# Patient Record
Sex: FEMALE | Race: WHITE | Employment: FULL TIME | URBAN - METROPOLITAN AREA
[De-identification: names, ages, dates, MRNs, and addresses within clinical notes are randomized per-mention and may not be internally consistent; named-entity substitution may affect disease eponyms.]

---

## 2017-02-06 NOTE — PROGRESS NOTES
[address] IS A 32year old female HERE FOR Patient presents with:  Medication Request: Ritalin 99NN and Concerta 96FD       History of present illness:     Concentration ok. No personal classes she's taking. Long commute 80 minutes one way. Methylphenidate HCl (RITALIN) 20 MG Oral Tab Take 1 tablet (20 mg total) by mouth daily. At 2 PM daily Disp: 30 tablet Rfl: 0   [DISCONTINUED] Methylphenidate HCl ER (CONCERTA) 54 MG Oral Tab CR Take 1 tablet (54 mg total) by mouth daily.  Disp: 30 tablet Some Day Smoker     Types: Cigarettes    Smokeless tobacco: Never Used    Comment: x 1 a week or less    Alcohol Use: Yes  1.5 oz/week    3 Glasses of wine per week         Comment: 1 x week    Drug Use: No    Sexual Activity: Not on file   Not on file  Ot

## 2017-02-06 NOTE — PATIENT INSTRUCTIONS
Consider cutting back to 36 mg Concerta in a few months since your weight & heart rate have been affected by medication, will keep same for now. Recheck 3 months.

## 2017-04-24 NOTE — PATIENT INSTRUCTIONS
Same dose Concerta for now, but in July if you are willing to try a change to Concerta 36, or decrease the Ritalin, we can explore that as a way to decrease total medication. Recommend trying lower Concerta due to number of hours/day affected.

## 2017-04-24 NOTE — PROGRESS NOTES
[address] IS A 32year old female HERE FOR Patient presents with:  Medication Follow-Up: refill and med check       History of present illness:     Here for followup.  Still long drives to work, and plans to attend a week-long family therapy mg total) by mouth daily. Disp: 30 tablet Rfl: 0   Methylphenidate HCl ER (CONCERTA) 54 MG Oral Tab CR Take 1 tablet (54 mg total) by mouth daily.  Disp: 30 tablet Rfl: 0   [DISCONTINUED] Methylphenidate HCl ER (CONCERTA) 54 MG Oral Tab CR Take 1 tablet (54 palpitations, anorexia, insomnia, mood instability. She is now near her lowest weight. Stable since last visit which was in winter with heavier clothing, and she states her family is small and thin build.  Due to current stress with brother, end of year and

## 2017-05-25 NOTE — TELEPHONE ENCOUNTER
Patient called to see if we can fax to pharmacy - she's in school until 3:50. Explained that it's a controlled substance and we cannot fax. Told her someone will be here until 5pm today.

## 2017-07-18 NOTE — PATIENT INSTRUCTIONS
Decrease Concerta to 36 mg daily, 3 months rx  Ritalin 1/2 tablet twice daily later in day.     Continue same birth control pill    Recheck in 3 months and Pap at that time. (in October)

## 2017-07-18 NOTE — PROGRESS NOTES
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  -     Methylphenidate HCl (RITALIN) 20 MG Oral Tab; Take 0.5 tablets (10 mg total) by mouth 2 (two) times daily.  -     Methylphenidate HCl (RITALIN) 20 MG Oral Tab;  Take 0.5 t

## 2017-09-07 NOTE — PROGRESS NOTES
Darius Moore IS A 32year old female [address] [place] Patient presents with:  Pap: Room 4.  Pt is here for pap and meds       History of present illness:     Concerta during day at 36 mg was ok, but didn't have 20 mg Ritalin for awhile so when she was o Tab Take 1 tablet (20 mg total) by mouth daily. Disp: 30 tablet Rfl: 0       Allergy:      No Known Allergies    Family history:       History reviewed. No pertinent family history.     Social history:         Social History  Social History   Marital status Oral Tab; Take 0.5 tablets (10 mg total) by mouth 2 (two) times daily.  -     Methylphenidate HCl (RITALIN) 20 MG Oral Tab; Take 0.5 tablets (10 mg total) by mouth 2 (two) times daily.  -     Methylphenidate HCl (RITALIN) 20 MG Oral Tab;  Take 0.5 tablets (

## 2017-10-06 NOTE — PROGRESS NOTES
Feliciano Walters is a 32year old female here for Patient presents with:  Physical: Pap and routine meds      HPI:   Patient complains of here for refill and physical.     Some stuffiness of sinuses during year.     For ADD: Concerta 36 working well Glasses of wine per week         Comment: 1 x week    Drug use: No    Sexual activity: Not on file     Other Topics Concern    Caffeine Concern Yes    Comment: 1 cups daily x coffee    Stress Concern No    Weight Concern No    Special Diet No    Exercise Y no apparent distress  SKIN: no rashes,no suspicious lesions  EYES:PERRLA, EOMI, conjunctiva are clear  HEENT: atraumatic, normocephalic,external ears and TMs normal, nose patent with mild congestion, throat normal with mild erythema.   NECK: supple,no adeno Final   • LEUKOCYTES 12/03/2015 trace  Negative Final   • APPEARANCE 12/03/2015 sl cloudy  Clear Final   • URINE-COLOR 12/03/2015 pale yellow  Yellow Final   • Multistix Lot# 12/03/2015 [id number]  Numeric Final   • Multistix Expiration Date 12/03/2015 12/2016

## 2017-12-28 NOTE — PROGRESS NOTES
HPI:   Oneyda Hernandez is a 32year old female that presents for follow up ADHD. She is .   Currently on methylphenidate extended release 36 mg in the morning and then takes methylphenidate standard release 10 mg twice daily as ne oz   BMI 18.45 kg/m²  Estimated body mass index is 18.45 kg/m² as calculated from the following:    Height as of this encounter: 62.5\". Weight as of this encounter: 102 lb 8 oz. Vital signs reviewed. Appears stated age, well groomed.   Physical Exam:

## 2018-01-03 NOTE — TELEPHONE ENCOUNTER
Pt was given controlled Rx's at OV last week    Pt thought the dose was incorrect as it was 10mg, wanted new Rx for 20mg    After looking into it pt was given the same dose, just a different Sig    10mg BID as opposed to 20mg 1/2 BID

## 2018-01-04 NOTE — TELEPHONE ENCOUNTER
New Rx sent on 10/6/17 at [facility] for one year  DENIED AS DUPLICATE, INSTRUCTIONS TO PHARMACY TO CHECK FOR NEW/ REFILLS

## 2018-03-02 NOTE — TELEPHONE ENCOUNTER
Spoke with pt    Advised that she has been refilling too soon past several months in a row.     Pt states she has been self adjusting due to having her dose reduced \"2 appts ago\"    Dr Ana Stokes dropped Concerta from 54 mg to 36 mg in July 2017    Pt states

## 2018-03-02 NOTE — TELEPHONE ENCOUNTER
Got call from pharmacy informed that patient has been filling her Ritalin and Concerta   earlier each month. Now prescription says fill 3/11 patient wants it now. Advised pharmacy needs to fill when dr says on the prescription not early.  Patient has gettin

## 2018-03-02 NOTE — TELEPHONE ENCOUNTER
From: Darius Moore  Sent: 3/1/2018 8:40 PM CST  Subject: Medication Renewal Request    Darius Moore would like a refill of the following medications:     Methylphenidate HCl ER (CONCERTA) 36 MG Oral Tab CR [Joie Billings DO]   Pa

## 2018-03-02 NOTE — TELEPHONE ENCOUNTER
Pt called and stated:  \"Can we call the Pharmacy and authorize the Refill for now\".     Explained to pt (per TE - I read and summarized what the Triage note said to the pt) that it looks like, per the Pharmacy, she has been filling too soon and they conta

## 2018-03-02 NOTE — TELEPHONE ENCOUNTER
Verbal override to refill med now, need to see to discuss her need for additional medication. 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

## 2018-03-19 NOTE — PATIENT INSTRUCTIONS
Change dose to: Concerta 36 mg in morning (about 6 AM)  Ritalin 20 mg at lunch 12:20  Ritalin 10 mg at 4 or 5 PM before tutoring. Call if more fast heartrate, palpitations or lack of appetite for dinner. Recheck in 3 months.      Discussed coming in

## 2018-03-19 NOTE — PROGRESS NOTES
[address] IS A 29year old female HERE FOR Patient presents with:  Medication Follow-Up: Medication refill .  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  -     methylphenidate (RITALIN) 10 MG Oral Tab; 2 tablets (20 mg) at lunch, and 1 tablet at 4-5 PM.          Patient Instructions   Change dose to: Concerta 36 mg in morning (about 6 AM)  Rital

## 2018-05-21 NOTE — TELEPHONE ENCOUNTER
From: Vicki Apgar  Sent: 2018 9:12 AM CDT  Subject: Medication Renewal Request    Vicki Apgar would like a refill of the following medications:     Methylphenidate HCl ER (CONCERTA) 36 MG Oral Tab CR Bailey Galloway MD]

## 2018-05-22 NOTE — TELEPHONE ENCOUNTER
From: Suzie Donato  Sent: 5/21/2018 5:18 PM CDT  Subject: Medication Renewal Request    Suzie Donato would like a refill of the following medications:     Methylphenidate HCl ER (CONCERTA) 36 MG Oral Tab CR Aubree Gonzalez MD]

## 2018-06-18 NOTE — PROGRESS NOTES
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 Disp: 84 tablet Rfl: 3       Allergy:      No Known Allergies    Family history:       No family history on file.     Social history: Methylphenidate HCl ER (CONCERTA) 36 MG Oral Tab CR; Take 1 tablet (36 mg total) by mouth daily.  -     Methylphenidate HCl ER (CONCERTA) 36 MG Oral Tab CR; Take 1 tablet (36 mg total) by mouth daily.   -     methylphenidate (RITALIN) 10 MG Oral Tab; 2

## 2018-06-18 NOTE — PATIENT INSTRUCTIONS
Continue same combination of Concerta 36 mg in AM, ritalin 10 mg 2 tabs at lunch, and 1 tab of 10 mg at 4-5 PM.    Recheck in 3 months.

## 2018-09-11 NOTE — PATIENT INSTRUCTIONS
Physical in December at next refill visit for ADD. Refer DR. Roberta Ariza and associates for the acne and forehead lesion.

## 2018-09-11 NOTE — PROGRESS NOTES
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  Most days Oral Tab CR Take 1 tablet (36 mg total) by mouth daily.  Disp: 30 tablet Rfl: 0   methylphenidate (RITALIN) 10 MG Oral Tab 2 tablets (20 mg) at lunch, and 1 tablet at 4-5 PM. Disp: 90 tablet Rfl: 0   methylphenidate (RITALIN) 10 MG Oral Tab 2 tablets (20 mg file    Other Topics      Concerns:         Service: Not Asked        Blood Transfusions: Not Asked        Caffeine Concern: Yes          2 cups daily x coffee        Occupational Exposure: Not Asked        Hobby Hazards: Not Asked        Sleep Con are no Patient Instructions on file for this visit.

## 2018-11-27 NOTE — PROGRESS NOTES
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 Hungry today, appetite good, lost 3#. Not eating as much at school/work. No family history on file.     Social history:       Social History    Socioeconomic History      Marital status: Unknown      Spouse name: Not on file      Number of children: 0      Years of education: Not on file      Highest education level: Not o Heart regular rhythm, mildly fast heart rate. Affect appropriate. Test results:       Assessment & Plan:   Pauline Olvera was seen today for medication follow-up.     Diagnoses and all orders for this visit:    Attention deficit  -     Methylphenidate H

## 2018-12-03 NOTE — TELEPHONE ENCOUNTER
Zia at [facility] called. States patient is requesting Ritalin refill one week early because she is going out of town. They are asking if that is OK. Please advise.

## 2018-12-07 NOTE — TELEPHONE ENCOUNTER
Gynecology Medication Protocol [place]ki[place]94/7 3:41 AM   PASS-PENDING LAST PAP WNL--VIA MANUAL LOOKUP    Physical or Pelvic/Breast in past 12 or next 3 mos--VIA MANUAL LOOKUP     Written by Bruce Wright MD on 10/11/2017  2:21 PM   Pap is normal, HPV negat

## 2019-02-18 NOTE — PROGRESS NOTES
[address] IS A 29year old female HERE FOR Patient presents with:  Medication Request: Printed scripts        History of present illness:     Pt missed some work due to weather this year. Took 3 meds today but is out of all meds.  Methylp Socioeconomic History      Marital status: Unknown      Spouse name: Not on file      Number of children: 0      Years of education: Not on file      Highest education level: Not on file    Occupational History      Occupation: TEACHER        Employer: [employer] Bike Helmet: Not Asked        Seat Belt: Yes        Self-Exams: Not Asked    Social History Narrative      Long term relationship, no children. No pets (mom has one). Review of systems:     No chest discomfort, palpitations, or decreased appetite.

## 2019-02-18 NOTE — PATIENT INSTRUCTIONS
REcheck in 3 months.     COntinue usual regimen: Concerta generic 36 mg once a day, and 20 mg methylphenidate (Ritalin) at noon 1 tab 4-5 PM.

## 2019-03-04 NOTE — TELEPHONE ENCOUNTER
Name from pharmacy: [address] [facility] 1/20 TABLETS 28S         Will file in chart as: JUNEL FE 1/20 1-20 MG-MCG Oral Tab    Sig: TAKE ONE TABLET BY MOUTH DAILY.  NEEDS ANNUAL PHYSICAL FOR FURTHER REFILLS    Disp:  84 tablet    Refills:  0    Start: 3/4/2019    Beltran

## 2019-03-18 NOTE — TELEPHONE ENCOUNTER
Methylphenidate HCl ER (CONCERTA) 36 MG Oral Tab CR          Sig: Take 1 tablet (36 mg total) by mouth daily.     Disp:  30 tablet    Refills:  0    Start: 3/18/2019 - 4/17/2019    Earliest Fill Date: 3/18/2019    Class: Normal    Non-formulary    To pharma

## 2019-05-07 NOTE — PROGRESS NOTES
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     Social history:       Social History    Socioeconomic History      Marital status: Unknown      Spouse name: Not on file      Number of children: 0      Years of Weight Concern: No        Special Diet: No        Back Care: Not Asked        Exercise: Yes          Walking the dog 2x weekly        Bike Helmet: Not Asked        Seat Belt: Yes        Self-Exams: Not Asked    Social History Narrative      Long term r

## 2019-05-07 NOTE — TELEPHONE ENCOUNTER
From: Feliciano Walters  To: Jovanna Shelton MD  Sent: 5/7/2019 5:26 PM CDT  Subject: Visit Alkymos Button Dr. Valerio Leaks,     The pharmacy needs you to verify with my insurance before they can fill the new prescription.  Please call me as s

## 2019-05-07 NOTE — PATIENT INSTRUCTIONS
Change to Adderall XR 15 mg twice a day about 6 or 7 AM, and 6-8 hours later. Methylphenidate 10 mg only as needed for additional concentration aid at 5 PM or later.      Recheck 6 weeks with med check & physical, and call in between with any report of too

## 2019-05-08 NOTE — TELEPHONE ENCOUNTER
LOV 5/7/19    LAST LAB PAP 10/6/17    LAST RX 3/4/19    Next OV . No future appointments.       PROTOCOL    Gynecology Medication Protocol Passed5/8 2:47 PM   PASS-PENDING LAST PAP WNL--VIA MANUAL LOOKUP    Physical or Pelvic/Breast in past 12 or next 3 mos-

## 2019-05-08 NOTE — TELEPHONE ENCOUNTER
Patient called and stated she will be dropping it off at the [facility] in [place] [address].

## 2019-05-08 NOTE — TELEPHONE ENCOUNTER
Patient left voicemail requesting to change pharmacy to [facility] in [place], [address] [place] [address] Expressway

## 2019-06-06 NOTE — PROGRESS NOTES
Flavia Gallagher is a 34year old female here for Patient presents with:  Physical: No pap      HPI:   Patient complains of here for well exam.     Tolerating Adderall 15 mg AM 5:30 AM  and 2:30-3 PM, 10 mg methylphenidate 11 AM..  No psych side eff of children: 0      Years of education: Not on file      Highest education level: Not on file    Occupational History      Occupation: TEACHER        Employer: [employer] resource strain: Not on file      Food in History Narrative      Long term relationship, no children. No pets (mom has one). REVIEW OF SYSTEMS:     Negative for all systems listed except as documented below.   Constitutional:  Eye:  ENT:  Heme/lymph:  Cardiovascular:  GI:  :  OB/GYN:  Derm Endocervical or metaplastic cells present   Final   • General Categorization 10/06/2017 Negative for intraepithelial lesion or malignancy     Final   • Final Diagnosis Comment 10/06/2017    Final                    Value: This result contains rich text form [place]                                                                   First Screen:          Eli Boyle                                                               Rescreen:              Missy Huang

## 2019-06-06 NOTE — PATIENT INSTRUCTIONS
Health screening: Pap recommended beginning at age 24, then if normal every 3 years. HPV testing over age 27, HPV vaccine recommended if under 26. STD screening: if sexually active, chlamydia and gonorrhea testing recommended if single and/or under age 22. ages 25 to 44. Talk with your healthcare provider to make sure you’re up-to-date on what you need.   Screening Who needs it How often   Alcohol misuse All women in this age group At routine exams   Blood pressure All women in this age group Yearly checkup i risk for infection should talk with their healthcare provider Ask your healthcare provider   Vision All women in this age group At least 1 complete exam in your 25s, and 2 in your 35s   Vaccine2 Who needs it How often   Chickenpox (varicella) All women in a Td booster after age 25, then Td every 10 years   Counseling Who needs it How often   BRCA gene mutation testing for breast and ovarian cancer susceptibility Women with increased risk for having gene mutation When your risk is known   Breast cancer and c

## 2019-06-06 NOTE — PROGRESS NOTES
Amrit Montero is a 34year old female who presents today for one step TST. Questionnaire completed, no contraindications to placement. Pt advised she will need to returnin 48-72 hours for interpretation.   Unfortunately, given upcoming weekend,

## 2019-06-09 NOTE — TELEPHONE ENCOUNTER
From: Feliciano Walters  To: Jovanna Shelton MD  Sent: 6/8/2019 8:40 PM CDT  Subject: Prescription Question    Good evening! I just saw that my refill for the Remonia Dubin is being sent to the [facility].  Can I please change the

## 2019-07-30 NOTE — PROGRESS NOTES
Vicki Apgar IS A 34year old female [address] 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      Just returned from [place] with partner's family by mouth daily. Disp: 30 tablet Rfl: 0   Amphetamine-Dextroamphet ER (ADDERALL XR) 15 MG Oral Capsule SR 24 Hr Take 1 capsule (15 mg total) by mouth 2 (two) times daily.  Disp: 60 capsule Rfl: 0   methylphenidate (RITALIN) 10 MG Oral Tab Take 1 tablet (10 m violence:        Fear of current or ex partner: Not on file        Emotionally abused: Not on file        Physically abused: Not on file        Forced sexual activity: Not on file    Other Topics      Concerns:         Service: Not Asked        Blo (RITALIN) 10 MG Oral Tab; Take 1 tablet (10 mg total) by mouth daily. In afternoon          Patient Instructions   Continue current regimen, Adderall twice a day and methylphenidate later in afternoon. Recheck 3 months.

## 2019-07-30 NOTE — PATIENT INSTRUCTIONS
Continue current regimen, Adderall twice a day and methylphenidate later in afternoon. Recheck 3 months.

## 2019-09-06 NOTE — TELEPHONE ENCOUNTER
RX FROM July WAS FOR BID BUT AUGUST AND SEPT WERE NOT  Medication Detail     Medication Quantity Refills Start End   Amphetamine-Dextroamphet ER (ADDERALL XR) 15 MG Oral Capsule SR 24 Hr () 60 capsule 0 2019   Sig:   Take 1 capsule (1

## 2019-09-06 NOTE — TELEPHONE ENCOUNTER
Patient says her Adderall RX was written wrong. She takes it BID written for  QD. Given #60 but pharmacy would only give #30. She needs 2 new Rx.

## 2019-09-10 NOTE — TELEPHONE ENCOUNTER
ADD RX for 2  Tabs will not go through needs a Prior auth. Per pharamcy. Unable to do through BC/BS on phone.      Prior auth done wait for approval or denial.

## 2019-09-17 NOTE — TELEPHONE ENCOUNTER
From: Denzel Bermudez  To: Aby Young MD  Sent: 9/17/2019 4:49 PM CDT  Subject: Prescription Question    Hi Dr. Siomara Sauer,  My last adderall ER 15 milligram tablets were adjusted since the directions didn't say to take it twice daily.  They we

## 2019-09-18 NOTE — TELEPHONE ENCOUNTER
Prior auth denied. Doing an appeal. Faxed dr notes for appeal. Starting with   [employer]   [phone]. Starting notes from 2014 and any pertinent office notes.

## 2019-09-18 NOTE — TELEPHONE ENCOUNTER
Called insurance for PA pending. Rx is still in review but will be upgraded to urgent for approval. Will be approved or denied in 72 hours. Left detailed message for patient on phone regarding PA.  Patient can also  pay out of pocket and get reimbursed

## 2019-09-24 NOTE — TELEPHONE ENCOUNTER
Pt called with multiple prescription questions. She was transferred to Triage  to leave a detailed message.

## 2019-09-25 NOTE — TELEPHONE ENCOUNTER
Kitty Fernandezigned  Yesterday                Pt called with multiple prescription questions. She was transferred to Triage  to leave a detailed message.

## 2019-09-26 NOTE — TELEPHONE ENCOUNTER
Patient called about her Rx  for Adderall thinks it was done wrong. But it is correct it is in appeals. Did leave message that patient can pay for RX before until approved. She did pay for it.       Dispensed Written Strength Quantity Refills Days Supply Pro

## 2019-09-30 NOTE — TELEPHONE ENCOUNTER
Spoke with [employer] again today to get update on Prior Auth. The issues had become complicated because pt's insurance changed to a PPO effective 9/1/19.     They received the appeal on 9/18/19 but it was not marked as urgent, they have until 10/3/19 to resp

## 2019-10-01 NOTE — PROGRESS NOTES
[address] IS A 34year old female HERE FOR Patient presents with:  ADHD: medication check up  Imm/Inj: flu shot       History of present illness:     Was given Adderall tablets last time, (15 mg bid XR), they didn't last long.  Feels Adderall tablet Rfl: 0   [START ON 12/12/2019] methylphenidate (RITALIN) 10 MG Oral Tab Take 1 tablet (10 mg total) by mouth daily.  Disp: 30 tablet Rfl: 0   Amphetamine-Dextroamphet ER (ADDERALL XR) 15 MG Oral Capsule SR 24 Hr Take 1 capsule (15 mg total) by mouth activity:        Days per week: Not on file        Minutes per session: Not on file      Stress: Not on file    Relationships      Social connections:        Talks on phone: Not on file        Gets together: Not on file        Attends [employer] service: No 10 MG Oral Tab; Take 1 tablet (10 mg total) by mouth daily for 15 days. In afternoon  -     Amphetamine-Dextroamphet ER (ADDERALL XR) 15 MG Oral Capsule SR 24 Hr;  Take 1 capsule (15 mg total) by mouth 2 (two) times daily.  -     Amphetamine-Dextroamphet ER

## 2019-10-02 NOTE — TELEPHONE ENCOUNTER
Appeal came back as coverage only on ER tablets, not capsules    Left message for pt with this info, royal need to resend Rx if pt is agreeable

## 2019-10-03 NOTE — TELEPHONE ENCOUNTER
Dr Cristo Romano, pt willing to try tablets of the same strength    Will need to send new Rx's as tablets

## 2019-10-04 NOTE — TELEPHONE ENCOUNTER
Dr Aylin Srinivasan, we appear to have exhausted all appeals  Please advise?     Pt's insurance changed 9-1-19 and the new plan's response to our recent appeal was they will not approve the ER capsules  But will approve the XR tablets (which they imply is still consi

## 2019-10-22 NOTE — TELEPHONE ENCOUNTER
Patient comment was from last month. I thought I gave her enough for next 2 months. Please check on when she is filling the Ritalin. If she wants 30 tablets to get her caught up until 11/9, I can send one rx for 30 and then 2 rx's for 60 each.

## 2019-10-28 NOTE — TELEPHONE ENCOUNTER
Patient called back saying she is out of her medication the one is denied by her insurance and the other she could only get 15 pills it is unclear witch one is witch  she has asked for  Call back to figure this out

## 2019-10-29 NOTE — TELEPHONE ENCOUNTER
LOV 10/19      Please advise on refill. Thank You    30 tablets to get her caught up until 11/9, I can send one rx for 30 and then 2 rx's for 60 each.

## 2019-12-23 NOTE — TELEPHONE ENCOUNTER
daniel calling wanting to let  know patient is filling a medication sooner then normal , sent to triage voicemail .

## 2019-12-23 NOTE — PROGRESS NOTES
[id number] IS A 34year old female HERE FOR Patient presents with:  Medication Request       History of present illness:     Doing well at her new school, closer to her and has more time for after-school activities. No problems with med. file      Number of children: 0      Years of education: Not on file      Highest education level: Not on file    Occupational History      Occupation: TEACHER        Employer: [employer]    Social Needs      Financial resource strain: Not on Self-Exams: No    Social History Narrative      Long term relationship, no children. No pets (mom has one). Review of systems:     No other new symptoms or problems reported.      Exam:     BP 98/60   Pulse 79   Temp 99.2 °F (37.3 °C) (Oral)   Resp 16

## 2020-03-10 NOTE — PROGRESS NOTES
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     Current meds really aren't he Methylphenidate HCl ER (CONCERTA) 27 MG Oral Tab CR Take 1 tablet (27 mg total) by mouth 2 (two) times daily. 60 tablet 0   • methylphenidate (RITALIN) 10 MG Oral Tab Take 1 tablet (10 mg total) by mouth daily.  30 tablet 0   • Norethin Ace-Eth Estrad-JESS (J violence:        Fear of current or ex partner: Not on file        Emotionally abused: Not on file        Physically abused: Not on file        Forced sexual activity: Not on file    Other Topics      Concerns:         Service: Not Asked        Blo Methylphenidate HCl (RITALIN) 20 MG Oral Tab; Take 1 tablet (20 mg total) by mouth Noon. Second of 3  -     Methylphenidate HCl (RITALIN) 20 MG Oral Tab; Take 1 tablet (20 mg total) by mouth Noon. Third of 3.           Patient Instructions   New rxs written

## 2020-03-10 NOTE — PROGRESS NOTES
No prolonged tobacco discussion held. Pt has rare social cigarette with going out for drinks which doesn't happen often. NOt interested in stopping but I told her it's best to give up entirely.

## 2020-05-14 NOTE — TELEPHONE ENCOUNTER
LOV 3/10/2020    LAST LAB 10/06/2017    LAST RX   Norethin Ace-Eth Estrad-FE (JUNEL FE 1/20) 1-20 MG-MCG Oral Tab 84 tablet 3 8/6/2019    Sig:   Take 1 tablet by mouth once daily. Next OV No future appointments.     PROTOCOL   Gynecology Medicatio

## 2020-06-01 NOTE — PATIENT INSTRUCTIONS
Keep appointment in July for well visit (Pap & physical). Continue same doses of Concerta 27 mg twice daily and ritalin 20 mg in early evening. Take care and stay well and prudent with socialization over the summer!

## 2020-06-01 NOTE — PROGRESS NOTES
[address] IS A 27year old female evaluated via telehealth visit FOR Patient presents with:  Medication Follow-Up: ADD  meds   due to current national emergency with SARS-CoV-2 pandemic.      History of present illness:   2-way communication tablet 0   • Methylphenidate HCl ER (CONCERTA) 27 MG Oral Tab CR Take 1 tablet (27 mg total) by mouth 2 (two) times daily.  5:30 AM & 3:30 PM 60 tablet 0   • Methylphenidate HCl ER (CONCERTA) 27 MG Oral Tab CR Take 1 tablet (27 mg total) by mouth 2 (two) ti Active member of club or organization: Not on file        Attends meetings of clubs or organizations: Not on file        Relationship status: Not on file      Intimate partner violence:        Fear of current or ex partner: Not on file        Emotionally a

## 2020-07-30 NOTE — TELEPHONE ENCOUNTER
Future Appointments   Date Time Provider Andres Kasie   7/30/2020  2:20 PM Zoila Bojorquez MD [facility]

## 2020-07-30 NOTE — PATIENT INSTRUCTIONS
Don't cut concerta since it is an extended release med. Extra ritalin would be preferable.     Suggest the Concerta 27 mg twice a day as you currently take it, ritalin 20 mg at 2 PM and an extra 10 mg in evening if you need to complete work in evening 6-10

## 2020-07-30 NOTE — TELEPHONE ENCOUNTER
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

## 2020-07-30 NOTE — TELEPHONE ENCOUNTER
Patient just got off the phone with dr Kanika Calvillo and patient said she is missing the medication for 09/01/2020 for medication Methylphenidate HCl (RITALIN) 20 MG Oral Tab

## 2020-07-30 NOTE — PROGRESS NOTES
[address] IS A 27year old female HERE FOR No chief complaint on file. History of present illness:     Virtual visit with both audio and visual 2-way components, conducted with Ector in [facility], due to patient exposure to coronavirus.  Felipe Vance in afternoon or early evening 45 tablet 0   • Methylphenidate HCl ER (CONCERTA) 27 MG Oral Tab CR Take 1 tablet (27 mg total) by mouth 2 (two) times a day.  Early am and lunch 60 tablet 0   • Methylphenidate HCl ER (CONCERTA) 27 MG Oral Tab CR Take 1 tablet Food insecurity:        Worry: Not on file        Inability: Not on file      Transportation needs:        Medical: Not on file        Non-medical: Not on file    Tobacco Use      Smoking status: Current Some Day Smoker        Types: Cigarettes      Smoke Affect normal, appropriate. Test results:       Assessment & Plan:   Diagnoses and all orders for this visit:    Attention deficit    Other orders  -     Methylphenidate HCl ER (CONCERTA) 27 MG Oral Tab CR;  Take 1 tablet (27 mg total) by mouth 2 (t

## 2020-07-31 NOTE — TELEPHONE ENCOUNTER
Called pt stating had received a call back from PCP and cleared up rx issues. Nothing further needed at this time. No further questions or concerns. Pt verbalized understanding and agreed with POC.

## 2020-08-07 NOTE — TELEPHONE ENCOUNTER
LOV 10/6/2017    LAST LAB 10/16/2017    LAST RX 5/14/2020    Next OV No future appointments.       PROTOCOL  Gynecology Medication Protocol Passed8/7 1:22 PM   PASS-PENDING LAST PAP WNL--VIA MANUAL LOOKUP    Physical or Pelvic/Breast in past 12 or next 3 mo

## 2020-10-15 NOTE — TELEPHONE ENCOUNTER
Methylphenidate HCl ER (CONCERTA) 27 MG Oral Tab CR    Patient was scheduled for Saturday 10/17/20 however since the doctor is not working that Saturday the patient was moved to 11/7/2020.  Patient is going to run out of the above medication and requesting

## 2020-10-15 NOTE — TELEPHONE ENCOUNTER
LOV 7/30/2020    LAST LAB n/a    LAST RX   Methylphenidate HCl ER (CONCERTA) 27 MG Oral Tab CR 60 tablet 0 9/30/2020 10/30/2020         Next OV   Future Appointments   Date Time Provider Andres Ferro   11/7/2020  9:00 AM Buddy Velásquez MD [facility]

## 2020-10-19 NOTE — TELEPHONE ENCOUNTER
Pt called to ck on status of Refill said pharmacy only received the 27mg still waiting for the 20mg Rx

## 2020-10-20 RX ORDER — METHYLPHENIDATE HYDROCHLORIDE 20 MG/1
TABLET ORAL
Qty: 45 TABLET | Refills: 0 | Status: CANCELLED | OUTPATIENT
Start: 2020-10-20

## 2020-10-27 NOTE — TELEPHONE ENCOUNTER
Called Pharmacy to ask what the issue with insurance was. Advised Dr. Lesia Nichols is licensed to prescribe these meds. They checked and said the patient was trying to pick them up too soon.   She picked up one today and she will be able to  the other to

## 2020-10-27 NOTE — TELEPHONE ENCOUNTER
Patient stated she is trying to fill her medications:   Methylphenidate HCl (RITALIN) 20 MG Oral Tab  And   Methylphenidate HCl ER (CONCERTA) 27 MG Oral Tab CR    She stated the pharmacy told her that the  Is not licensed with insurance any more.     Pt

## 2020-11-09 NOTE — TELEPHONE ENCOUNTER
LOV 7/30/2020    LAST LAB 10/06/2017    LAST RX   AUROVELA FE 1/20 1-20 MG-MCG Oral Tab 84 tablet 0 8/7/2020    Sig:   TAKE ONE TABLET BY MOUTH ONCE DAILY           Next OV   Future Appointments   Date Time Provider Andres Ferro   11/23/2020  1:00 PM

## 2020-11-23 NOTE — PROGRESS NOTES
[address] IS A 27year old female [facility] Patient presents with:  Medication Follow-Up: insurance change in 1/21 . (Concerta ) needs alternative . 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  45 tablet 0   • Methylphenidate use: Yes        Alcohol/week: 2.5 standard drinks        Types: 3 Glasses of wine per week        Comment: Socially       Drug use: No      Sexual activity: Not on file    Lifestyle      Physical activity        Days per week: Not on file        Minutes pe patient needs and cannot  tomorrow. -     Methylphenidate HCl ER (CONCERTA) 27 MG Oral Tab CR; Take 1 tablet (27 mg total) by mouth 2 (two) times a day. generic  -     Methylphenidate HCl ER (CONCERTA) 27 MG Oral Tab CR;  Take 1 tablet (27 mg total)

## 2020-11-24 NOTE — TELEPHONE ENCOUNTER
Pt called stating she was informed by the pharmacy the insurance co will not cover the     \"Methylphenidate HCl ER (CONCERTA)\"    She said they faxed something over in regard to what they will cover.

## 2020-11-25 NOTE — TELEPHONE ENCOUNTER
Contacted [employer] indicating per pt's insurance will not cover BID, only once a day. Awaiting fax to fill out PA form for BID. Will need to privide clincial notes to support medical necessity to be taking BID.  FYI. Hold for fax.

## 2020-11-27 NOTE — TELEPHONE ENCOUNTER
Patient stated she is waiting to hear about a medication that will be covered by her insurance. She stated she has been waiting for 5 days now.

## 2020-11-30 NOTE — TELEPHONE ENCOUNTER
Called pt to inform update that we attempted PA, however, per pt's insurance will not cover twice a day, only once a day.  Received form for step therapy request. Informed PCP will RTC tomorrow (12/1) to address and will fax back to [employer] @[phone]

## 2020-11-30 NOTE — TELEPHONE ENCOUNTER
Patient stated that an authorization form was sent to Dr. Aylin Srinivasan for the following medication.       Methylphenidate HCl ER (CONCERTA) 27 MG Oral Tab CR 60      Patient stated that she has been out of the medication and has been taking extra of the 20 mg pi

## 2020-12-01 NOTE — TELEPHONE ENCOUNTER
Triage call transferred. Spoke with pt to inform Dr. Jose Ahn completed step therapy form and we sent with all clinical notes from within the last year faxed to [employer] @[phone]. Received fax confirmation. No further questions or concerns.  P

## 2020-12-01 NOTE — TELEPHONE ENCOUNTER
Completed step therapy form along with all clinical notes from within the last year faxed to "[employer] @[phone]. Received fax confirmation. Hold for response.

## 2020-12-02 NOTE — TELEPHONE ENCOUNTER
Kallie Neil from [facility] stated that the medication Concera was approved for 60 pills per 30 days.     Kallie Neil, [address] [facility]

## 2020-12-02 NOTE — TELEPHONE ENCOUNTER
Lm for pt ([id number] per HIPAA) to inform received response from [facility] indicating APPROVED Concerta 46SO BID 62/92/80- 11/27/2021. To call back at the office if any further questions.

## 2020-12-15 NOTE — TELEPHONE ENCOUNTER
LOV 3/10/2020    LAST LAB    LAST RX concerta     Next OV No future appointments. PROTOCOL  Methylphenidate HCl ER (CONCERTA) 27 MG Oral Tab CR          Sig: Take 1 tablet (27 mg total) by mouth 2 (two) times a day.  Generic please, fill today, patient n

## 2021-01-15 NOTE — TELEPHONE ENCOUNTER
Pt is going out of town and wants to know if she can get early refills on   Methylphenidate HCl ER (CONCERTA) 27 MG Oral Tab CR and Methylphenidate HCl (RITALIN) 20 MG Oral Tab said pharmacy told her she needs to contact 's office

## 2021-01-15 NOTE — TELEPHONE ENCOUNTER
rx done as requested, note sent to pt that she should be able to stay on a monthly regimen without early fills now since the Concerta was approved.

## 2021-02-02 NOTE — TELEPHONE ENCOUNTER
LOV 11/23/2020    LAST LAB 10/6/2017     LAST RX   AUROVELA FE 1/20 1-20 MG-MCG Oral Tab 84 tablet 0 11/9/2020    Sig:   TAKE 1 TABLET BY MOUTH ONCE DAILY           Next OV   Future Appointments   Date Time Provider Andres Ferro   2/24/2021  4:20 PM P

## 2021-02-26 NOTE — PATIENT INSTRUCTIONS
Health screening: Pap recommended if normal every 3 years. Will notify if suggestion of infection on pap comments. You may have yeast & bacterial vaginitis. HPV testing with pap over age 27,  Tdap or Td recommended if tetanus not received for 10 years.  Co women in this age group Yearly checkup if your blood pressure is normal  Normal blood pressure is less than 120/80 mm Hg  If your blood pressure reading is higher than normal, follow the advice of your healthcare provider   Breast cancer All women in this often   Chickenpox (varicella) All women in this age group who have no record of this infection or vaccine 2 doses; the second dose should be given 4 to 8 weeks after the first dose   Hepatitis A Women at increased risk for infection should talk with their When your risk is known   Breast cancer and chemoprevention Women at high risk for breast cancer When your risk is known   Diet and exercise Women who are overweight or obese When diagnosed, and then at routine exams   Domestic violence Women at the age in

## 2021-02-26 NOTE — PROGRESS NOTES
Inna Wade is a 27year old female here for Patient presents with:  Physical: with pap      HPI:   Patient complains of here for well exam.    Current meds working out ok 27 mg bid with Concerta and 1.5 tabs of 20 mg ritalin later in day. tomorrow. 60 tablet 0   • Methylphenidate HCl ER (CONCERTA) 27 MG Oral Tab CR Take 1 tablet (27 mg total) by mouth 2 (two) times a day.  60 tablet 0   • Methylphenidate HCl ER (CONCERTA) 27 MG Oral Tab CR Take 1 tablet (27 mg total) by mouth 2 (two) times a Employer: [employer]    Social Needs      Financial resource strain: Not on file      Food insecurity        Worry: Not on file        Inability: Not on file      Transportation needs        Medical: Not on file        Non-medical: Not on except as documented below. Constitutional: no appetite change. Eye:  ENT:  Heme/lymph:  Cardiovascular:no palpitations or chest pain  GI:  :  OB/GYN: plans for kids in 2 years or so. Asked if she needs to go off the pill now.    Derm:  Neuro: no insom 0.0mm  0.0 - 11 mm Final       Going to [place] to see mother on 3/13 needs rx by 3/11.      ASSESSMENT AND PLAN:   Bonilla Gaspar was seen today for physical.    Diagnoses and all orders for this visit:    Encounter for well woman exam with routine gynecological ex advance including psych consult re: any acceptable meds during pregnancy for ADD symptoms. BMI is Body mass index is 19.57 kg/m²., Habits:   To avoid harmful effects on health, recommend not to take more than 1 alcoholic drink (12 oz beer, 6 oz wine or 1.5 ages 24 and older Women between ages 24 and 34 should have a Pap test every 3 years; women between ages 27 and 72 are advised to have a Pap test plus an HPV test every 5 years   Chlamydia Sexually active women ages 22 and younger, and women at increased ri over 6 months; second dose should be given 1 month after the first dose; the third dose should be given at least 2 months after the second dose and at least 4 months after the first dose   Haemophilus influenzae Type B (HIB) Women at increased risk for inf Prevention of skin cancer in fair-skinned adults At routine exams   Use of tobacco and the health effects it can cause All women in this age group Every visit   1 According to the ACS, women ages 21 to 44 years should have a clinical breast exam (CBE) as p

## 2021-03-12 NOTE — TELEPHONE ENCOUNTER
Pharmacy calling stating that pt is requesting refill on medication. She is going out of town tomorrow and needs it refilled. Pharmacy also said that pt takes 1 & 1/2 pills a day, but script is written for 30 pills which would be for 20 days.  Asking if scr

## 2021-03-12 NOTE — TELEPHONE ENCOUNTER
Last refill  Methylphenidate HCl (RITALIN) 20 MG Oral Tab 30 tablet 0 3/11/2021 4/10/2021   Sig:   Take 1.5 tablets (30 mg total) by mouth daily. Early evening       rx pended for your approval if ok. Please review and advise. Thank you.

## 2021-05-06 NOTE — PROGRESS NOTES
Video visit  Please note that the following visit was completed using two-way, real-time interactive audio and video communication.   This has been done in good luis to provide continuity of care in the best interest of the provider-patient relationship, d HCl 20 MG Oral Tab Take 1 to 1.5 tablets in the afternoon or evening 45 tablet 0   • Norethin Ace-Eth Estrad-FE (AUROVELA FE 1/20) 1-20 MG-MCG Oral Tab Take 1 tablet by mouth daily.  84 tablet 3   • Methylphenidate HCl ER (CONCERTA) 27 MG Oral Tab CR Take 1 MG Oral Tab CR Take 1 tablet (27 mg total) by mouth 2 (two) times a day. Generic 60 tablet 0   • Methylphenidate HCl (RITALIN) 20 MG Oral Tab Take 1.5 tablets (30 mg total) by mouth daily.  45 tablet 0     Allergies:No Known Allergies    Objective:   Physic

## 2021-06-02 NOTE — TELEPHONE ENCOUNTER
Pt calling stating she got her last refill on 5/7/21, and asking if she can get her prescriptions refilled sooner. She is going out of town on a conference June 7th and asking if doctor will send refill to pharmacy to be picked up this weekend.  Please yobani

## 2021-06-25 NOTE — TELEPHONE ENCOUNTER
Pt is going out of town on 6/30/21 wants to know if we can contact pharmacy and inform them ok for her to  her Rx's below early,  already sent Rx's to pharmacy but they are dated to be filled on 7/10/21    Methylphenidate HCl 20 MG Oral Tab  Methy

## 2021-06-28 NOTE — TELEPHONE ENCOUNTER
Called Pharmacy who states the patient needs to check with her insurance first to see if they are willing to cover and early rx. Then both the ordering physician and the Pharmacist have to agree to fill early. Called patient and notified of above.   She

## 2021-06-28 NOTE — TELEPHONE ENCOUNTER
Dr. Shauna Menjivar,  Please advise if early refill is OK with you. Methylphenidate HCl ER (CONCERTA) 27 MG Oral Tab CR 60 tablet 0 7/10/2021 8/9/2021   Sig:   Take 1 tablet (27 mg total) by mouth 2 (two) times a day.  Early AM & noon       Methylphenidate HCl 20

## 2021-06-28 NOTE — TELEPHONE ENCOUNTER
Patient called insurance and they agreed to authorize early fill on  6/30/2021. She is leaving on  7/01/2021 early morning for trip.

## 2021-06-28 NOTE — TELEPHONE ENCOUNTER
Called pt to inform called [phone] listed and spoke with pharmacist to inform per PCP ok for early refill of Methylphenidate HCl 20 MG and Methylphenidate HCl ER 27 MG.  Informed pt contacted insurance and received authorization for early refill

## 2021-07-20 NOTE — TELEPHONE ENCOUNTER
Last tele visit with Dr. Mulu Ferguson 5/6/21  Last refill: 7/10/21    Future Appointments   Date Time Provider Andres Kasie   8/30/2021  3:20 PM Víctor Sim MD [facility]     Rx's pended for your approval if ok.   To on call- Dr. Mulu Ferguson  (changed start montez

## 2021-07-20 NOTE — TELEPHONE ENCOUNTER
We had to rsch pt's appt to 8/30/21 for , pt only wants to see  needs refill for 30 day supply on concerta and methylphenidate

## 2021-07-27 NOTE — TELEPHONE ENCOUNTER
Pt calling back and said that she picked up her last refill on the 27th. So she is out and needs her refill for today. Needs the refill changed from 8/10/21 to today 7/27/21.  Please advise

## 2021-07-27 NOTE — TELEPHONE ENCOUNTER
[facility] and spoke with pharmacist to clarify prescription dates. Records indicate last received 3 month supply on 5/6/21- pt picked up on 5/7/21, 6/7/21, and 6/28/21. Other scripts on file shows \"closed\". Pt due for new script.  Recent s

## 2021-07-28 NOTE — TELEPHONE ENCOUNTER
Also see MyChart message from 07-22-21 and Refill message from 07-20-21. Patient received 30 day supplies of medications on 06-07-21 and 06-28-21 (picked up early d/t work conference). Should have enough pills to last until August 6th.  States she doesn'

## 2021-07-28 NOTE — TELEPHONE ENCOUNTER
From: Tacos Pichardo  To: Yeimy Moreau DO  Sent: 7/28/2021 2:46 PM CDT  Subject: Prescription Question    Good afternoon,     I have searched my house inside and out and can’t seem to find the last 27mg bottle that was filled for vacation anywh

## 2021-07-28 NOTE — TELEPHONE ENCOUNTER
Patient stated her script for Methylphenidate HCI 20 mg and Methylphenidate HCI ER 27 MG was filled on 6/27/21. She said she is out of medication and pharmacy will need a new script for today.     Judy [address], [address]

## 2021-07-28 NOTE — TELEPHONE ENCOUNTER
Re-pended Rx's with start date of today (7/28/21). RNs unable to sign prescriptions due to security. To on call- Dr. Nallely Hunter  Please review and sign. Thank you.

## 2021-09-09 NOTE — PROGRESS NOTES
Video visit  Please note that the following visit was completed using two-way, real-time interactive audio and video communication.   This has been done in good luis to provide continuity of care in the best interest of the provider-patient relationship, d Tab CR Take 1 tablet (27 mg total) by mouth 2 (two) times a day. Early AM and noon 60 tablet 0   • methylphenidate (RITALIN) 20 MG Oral Tab Take 1.5 tablets (30 mg total) by mouth daily.  Early evening 45 tablet 0   • [START ON 10/10/2021] methylphenidate ( 1 tablet (27 mg total) by mouth 2 (two) times a day. Early AM and noon   • Methylphenidate HCl ER (CONCERTA) 27 MG Oral Tab CR 60 tablet 0     Sig: Take 1 tablet (27 mg total) by mouth 2 (two) times a day.  Early AM and noon   • methylphenidate (RITALIN) 20

## 2021-11-04 NOTE — TELEPHONE ENCOUNTER
Methylphenidate and Concerta   Asking for fill date to be changed to  tomorow 11/05/21  so Prior Auth may be obtained before she runs out of medication on Sunday.     Please advise

## 2021-11-06 NOTE — TELEPHONE ENCOUNTER
Pharmacy called questioning patient picking up Ritalin and Concerta early. I reviewed TE and patient message from 11/4 with pharmacist stating ok to fill early.  Pharmacist is going to fill Rxs now but states that he does this for patient (early fills) ever

## 2021-11-22 NOTE — TELEPHONE ENCOUNTER
D/w pt at ov and reiterated that meds need to be filled at the time they are due and cannot be earlier.  She acknowledged understanding

## 2021-11-23 NOTE — PROGRESS NOTES
Subjective:   Patient ID: Suzie Donato is a 32year old female. HPI   31yr old female presents for f/u on ADD meds. ADD, stable. States she feels like over the past year she has been on a good regimen with PCP.  Taking Concerta 33EV po bid Cream      • Methylphenidate HCl ER (CONCERTA) 27 MG Oral Tab CR Take 1 tablet (27 mg total) by mouth 2 (two) times a day. Early AM and noon 60 tablet 0   • methylphenidate (RITALIN) 20 MG Oral Tab Take 1.5 tablets (30 mg total) by mouth daily.  Early eveni before they are due, informed pt that these are controlled substances and we will not approve early refills, I along with the pharmacist do not feel comfortable with this  - she will likely need to see psychiatrist if she will continues to require current

## 2021-12-08 NOTE — TELEPHONE ENCOUNTER
Triage call transferred. Spoke with pt indicating had picked up last script on 11/4/21 and is now out. Informed per conversation with on call- AMS and rx dispense hx, pt picked up rx on 11/6/21.   Pt indicated per [employer], with another RN, Holger Matamoros

## 2021-12-08 NOTE — TELEPHONE ENCOUNTER
Pharmacy calling stating that she needs to speak with a nurse. Pt states she can  prescription sooner but they need the OK to do that. Please call pharmacy back if they can refill today or if pt has to wait until the 10th.

## 2021-12-08 NOTE — TELEPHONE ENCOUNTER
See TE 11/6/21 and OV 11/22/21: Attention deficit disorder (ADD) without hyperactivity  (primary encounter diagnosis)  Tachycardia  - denies any SEs from medication  - tachycardia noted on exam today and previous ov, discussed with pt that it is a SE of me

## 2021-12-10 NOTE — TELEPHONE ENCOUNTER
Spoke to pharmacist. Stated that insurance will only cover 30 tablets in a 30 day period, and that another PA might be needed. Spoke to Shamar Lee at [phone].  Stated that she could not tell if another PA was needed but that she would fax m

## 2022-03-20 PROBLEM — F98.8 ATTENTION DEFICIT DISORDER (ADD) WITHOUT HYPERACTIVITY: Status: ACTIVE | Noted: 2022-03-20

## 2022-03-23 NOTE — TELEPHONE ENCOUNTER
Pt calling back-- had scheduled an appt for annual pe but appt was not long enough. Lives farther away and still in school. Scheduled pt for annual pe in June. Pt is asking for refill in May to get her through until appt. Rehana Tejada she has one to get picked up for April, but would need one for May.  Please advise    Future Appointments   Date Time Provider Andres Ferro   6/6/2022  4:00 PM Jesús Schwartz,  [facility]     methylphenidate (RITALIN) 20 MG Oral Tab  Methylphenidate HCl ER (CONCERTA) 27 MG Oral Tab CR

## 2022-04-11 RX ORDER — METHYLPHENIDATE HYDROCHLORIDE 20 MG/1
30 TABLET ORAL DAILY
Qty: 45 TABLET | Refills: 0 | OUTPATIENT
Start: 2022-04-11 | End: 2022-05-11

## 2022-04-11 RX ORDER — METHYLPHENIDATE HYDROCHLORIDE 27 MG/1
27 TABLET ORAL 2 TIMES DAILY
Qty: 60 TABLET | Refills: 0 | OUTPATIENT
Start: 2022-04-11 | End: 2022-05-11

## 2022-04-11 NOTE — TELEPHONE ENCOUNTER
[facility]  To: Heather Colon DO  Sent: 4/10/2022 7:54 PM CDT  Subject: Refill Until Next Appointment    Good Evening,  I just put in a request to refill my medications. Last visit was not with Dr. Jerry French because she was out and the doctor only sent 2 months worth. My next appointment isn't until June and I will need a refill for both medications to get me through May until my appointment. Thank you!

## 2022-04-11 NOTE — TELEPHONE ENCOUNTER
LOV 02-21-22. Had follow up on 04-09-22 but cancelled and is now rescheduled to 06-06-22.     Pended one month refills for your approval.

## 2022-07-13 NOTE — TELEPHONE ENCOUNTER
----- Message from [facility], DO sent at 7/12/2022  4:27 PM CDT -----  Pls inform pt that labs all wnl except minimally elevated platelets.  Will monitor with next set of labs

## 2022-08-22 NOTE — TELEPHONE ENCOUNTER
Patient's appt was cancelled for 9/1/  Rescheduled for 9/29/2002. Will be out of her meds on 9/6/2022. Patient takes Concerta 95DG and Ritalin 20mg.

## 2022-08-25 RX ORDER — METHYLPHENIDATE HYDROCHLORIDE 20 MG/1
30 TABLET ORAL DAILY
Qty: 45 TABLET | Refills: 0 | OUTPATIENT
Start: 2022-08-25 | End: 2022-09-24

## 2022-08-25 RX ORDER — METHYLPHENIDATE HYDROCHLORIDE 27 MG/1
27 TABLET ORAL 2 TIMES DAILY
Qty: 60 TABLET | Refills: 0 | OUTPATIENT
Start: 2022-08-25 | End: 2022-09-24

## 2022-09-06 NOTE — TELEPHONE ENCOUNTER
See routed PM 9/1/22 [facility] triage hold no longer available). Availability with Dr. Steph Cordon Friday (9/9).

## 2022-09-07 NOTE — TELEPHONE ENCOUNTER
Pt is aware she needs appt prior to med refills q3mo. This has been reiterated to her numerous times. She may schedule appt with Dr. Juan Alberto Oviedo.

## 2022-09-07 NOTE — TELEPHONE ENCOUNTER
Spoke with patient cannot do a video with Dr Donny Garcia since she is a  and she is in class at those hours. Patient is out of medications and can make an appt for next week to start seeing Dr Donny Garcia. Could we please fill one more time. Please advise.

## 2022-09-07 NOTE — TELEPHONE ENCOUNTER
Patient made a appointment for Sept 19th with Dr. Ivett Pak. Cannot do Sept 12th has a school function.  Earliest she can get here is 4:45pm.     Future Appointments   Date Time Provider Andres Ferro   9/19/2022  5:00 PM Rogerio Snow, DO [facility]   9/29/2022  4:20 PM Micheal Schwartz, DO [facility]

## 2022-09-08 NOTE — TELEPHONE ENCOUNTER
Pt scheduled with Dr Hakeem Marie as new Pt on 9-19-22. Pt needs enough med to hold her over. Did say ins co could reject due to being to soon. How does she get around that from happening?

## 2022-10-31 NOTE — PATIENT INSTRUCTIONS
Sent rx for Adderall XR capsule, and methylphenidate once daily in afternoon.  I continue to believe this is a better regimen for patient than the Adderall XR tablet that was substituted on her last rx order, and better than going back to Concerta generic w Tiigi 34 October 31, 2022       RE: Sarina Gonsalez      To Whom It May Concern,    This is to certify that Sarina Gonsalez was hospitalized 10/24/2022 through 10/31/2022 and father Patt Marks was with his son during the hospitalization. Thank you for your assistance in this matter.       Sincerely,  [address] [phone]

## 2022-11-17 NOTE — TELEPHONE ENCOUNTER
Pt called stating her next Adderall refill needs to go to [address], [place], [place] -  [phone]. He other pharmacies she has used do not have in stock still.     Also said Prior Auth needed

## 2022-11-17 NOTE — TELEPHONE ENCOUNTER
Called [facility] @[phone] to indicated neither meds require PA at this time. Pharmacy is running meds through for 90 days and its for 30 day supply. Dr. Norman Gregory- meds are pended to new pharmacy per pt request.   Please review and approve. Thank you.

## 2022-12-14 NOTE — TELEPHONE ENCOUNTER
Patient's medication of the Amphetamine 10mg needs to be sent to another pharmacy.     Last office visit: (if over 1 year, schedule appointment)    Appointment scheduled with:    Requested medication: Adderall 10mg     Pharmacy:[facility] in [facility] on file

## 2022-12-15 NOTE — TELEPHONE ENCOUNTER
Pt. Returning call to provide name of medication.  Stated she needs Prior Auth for Adderall 15 b.i.d.  Qty:60

## 2022-12-15 NOTE — TELEPHONE ENCOUNTER
VML for pt asked her to call office to let us know name of medication Prior Auth was to be faxed here.

## 2022-12-15 NOTE — TELEPHONE ENCOUNTER
Pt. Stated Prior Auth for medication was faxed yesterday from [facility]. Pt. Asking to confirm receipt.

## 2022-12-16 NOTE — TELEPHONE ENCOUNTER
Called Lisa, they said patient picked up 10mg for $10.   Called patient, she said she is having difficulty with 15mg. Ezra Aida has already complete PA and we should have paperwork for the provider to sign. Patient informed Dr. Koffi Alvarez is not in the office until Monday, will see what we can do. However, patient does have the 10mg for the time being.

## 2022-12-16 NOTE — TELEPHONE ENCOUNTER
[employer] called saying they still need a signature regarding patient's medication. It was faxed over on the Dec 14th. Dr Ivett Pak is not here and patient is out of medication. Prescription was filled on Dec 13th.

## 2023-01-11 NOTE — TELEPHONE ENCOUNTER
Patient called looking for Rx to be sent to [facility] pharmacy in [place] .

## 2023-01-12 NOTE — TELEPHONE ENCOUNTER
Pt called again stating that Rx needs to go to [facility] pharmacy in [address] [place] [address] .

## 2023-01-23 NOTE — TELEPHONE ENCOUNTER
LOV   LAST LAB 7/7/22     LAST RX   Norethin Ace-Eth Estrad-FE (AUROVELA FE 1/20) 1-20 MG-MCG Oral Tab 84 tablet 2 6/6/2022         Next OV   Future Appointments   Date Time Provider Andres Ferro   3/2/2023  4:00 PM Ellen Duron DO [facility]         PROTOCOL    Gynecology Medication Protocol Passed 01/21/2023 03:27 AM    PASS-PENDING LAST PAP WNL--VIA MANUAL LOOKUP    Physical or Pelvic/Breast in past 12 or next 3 mos--VIA MANUAL LOOKUP      Please advise?

## 2023-02-08 NOTE — TELEPHONE ENCOUNTER
Dr. Ry Whitt,  Please advise    Order pended to patient's requested Pharmacy.   Please advise if she can  early

## 2023-02-08 NOTE — TELEPHONE ENCOUNTER
Pt. Stated current pharmacy does not have medication in-stock. Requesting script to be sent to   [facility] [place], [address]) [phone]  Pt. Stated as of today pharmacy has 60 tabs on hand and is running out quickly so pt. Would like early refill for today. Pt. Not due until Sunday 2/12/23 for refill, please advise.      amphetamine-dextroamphetamine (ADDERALL) 10 MG Oral Tab

## 2023-02-09 NOTE — TELEPHONE ENCOUNTER
Pt called stating one of the Adderall scripts (10 mg) went to the wrong pharmacy. It should [facility] in [place]. Need to be corrected.

## 2023-06-06 ENCOUNTER — OFFICE VISIT (OUTPATIENT)
Dept: FAMILY MEDICINE CLINIC | Facility: CLINIC | Age: 33
End: 2023-06-06
Payer: COMMERCIAL

## 2023-06-06 VITALS
RESPIRATION RATE: 16 BRPM | WEIGHT: 110 LBS | DIASTOLIC BLOOD PRESSURE: 62 MMHG | SYSTOLIC BLOOD PRESSURE: 96 MMHG | HEIGHT: 62 IN | BODY MASS INDEX: 20.24 KG/M2 | TEMPERATURE: 98 F | HEART RATE: 72 BPM | OXYGEN SATURATION: 98 %

## 2023-06-06 DIAGNOSIS — F98.8 ATTENTION DEFICIT DISORDER (ADD) WITHOUT HYPERACTIVITY: ICD-10-CM

## 2023-06-06 DIAGNOSIS — Z00.01 ENCOUNTER FOR GENERAL ADULT MEDICAL EXAMINATION WITH ABNORMAL FINDINGS: Primary | ICD-10-CM

## 2023-06-06 PROCEDURE — 3008F BODY MASS INDEX DOCD: CPT | Performed by: FAMILY MEDICINE

## 2023-06-06 PROCEDURE — 99213 OFFICE O/P EST LOW 20 MIN: CPT | Performed by: FAMILY MEDICINE

## 2023-06-06 PROCEDURE — 3078F DIAST BP <80 MM HG: CPT | Performed by: FAMILY MEDICINE

## 2023-06-06 PROCEDURE — 99395 PREV VISIT EST AGE 18-39: CPT | Performed by: FAMILY MEDICINE

## 2023-06-06 PROCEDURE — 3074F SYST BP LT 130 MM HG: CPT | Performed by: FAMILY MEDICINE

## 2023-06-06 RX ORDER — DEXTROAMPHETAMINE SACCHARATE, AMPHETAMINE ASPARTATE, DEXTROAMPHETAMINE SULFATE AND AMPHETAMINE SULFATE 2.5; 2.5; 2.5; 2.5 MG/1; MG/1; MG/1; MG/1
TABLET ORAL
Qty: 45 TABLET | Refills: 0 | Status: SHIPPED | OUTPATIENT
Start: 2023-08-07

## 2023-06-06 RX ORDER — DEXTROAMPHETAMINE SACCHARATE, AMPHETAMINE ASPARTATE, DEXTROAMPHETAMINE SULFATE AND AMPHETAMINE SULFATE 2.5; 2.5; 2.5; 2.5 MG/1; MG/1; MG/1; MG/1
TABLET ORAL
Qty: 45 TABLET | Refills: 0 | Status: SHIPPED | OUTPATIENT
Start: 2023-07-07

## 2023-06-06 RX ORDER — DEXTROAMPHETAMINE SACCHARATE, AMPHETAMINE ASPARTATE, DEXTROAMPHETAMINE SULFATE AND AMPHETAMINE SULFATE 3.75; 3.75; 3.75; 3.75 MG/1; MG/1; MG/1; MG/1
15 TABLET ORAL DAILY
Qty: 30 TABLET | Refills: 0 | Status: SHIPPED | OUTPATIENT
Start: 2023-06-06 | End: 2023-07-06

## 2023-06-06 RX ORDER — DEXTROAMPHETAMINE SACCHARATE, AMPHETAMINE ASPARTATE, DEXTROAMPHETAMINE SULFATE AND AMPHETAMINE SULFATE 3.75; 3.75; 3.75; 3.75 MG/1; MG/1; MG/1; MG/1
15 TABLET ORAL DAILY
Qty: 30 TABLET | Refills: 0 | Status: SHIPPED | OUTPATIENT
Start: 2023-08-07 | End: 2023-09-06

## 2023-06-06 RX ORDER — DEXTROAMPHETAMINE SACCHARATE, AMPHETAMINE ASPARTATE, DEXTROAMPHETAMINE SULFATE AND AMPHETAMINE SULFATE 3.75; 3.75; 3.75; 3.75 MG/1; MG/1; MG/1; MG/1
15 TABLET ORAL DAILY
Qty: 30 TABLET | Refills: 0 | Status: SHIPPED | OUTPATIENT
Start: 2023-07-07 | End: 2023-08-06

## 2023-06-06 RX ORDER — DEXTROAMPHETAMINE SACCHARATE, AMPHETAMINE ASPARTATE, DEXTROAMPHETAMINE SULFATE AND AMPHETAMINE SULFATE 2.5; 2.5; 2.5; 2.5 MG/1; MG/1; MG/1; MG/1
TABLET ORAL
Qty: 45 TABLET | Refills: 0 | Status: SHIPPED | OUTPATIENT
Start: 2023-06-06

## 2023-09-24 DIAGNOSIS — Z30.41 ENCOUNTER FOR SURVEILLANCE OF CONTRACEPTIVE PILLS: ICD-10-CM

## 2023-09-25 RX ORDER — NORETHINDRONE ACETATE AND ETHINYL ESTRADIOL 1MG-20(21)
KIT ORAL
Qty: 84 TABLET | Refills: 0 | Status: SHIPPED | OUTPATIENT
Start: 2023-09-25

## 2023-09-25 NOTE — TELEPHONE ENCOUNTER
LOV 8/11/2023     LAST LAB     LAST RX   Medication Quantity Refills Start End   BLISOVI FE 1/20 1-20 MG-MCG Oral Tab 84 tablet 2 1/24/2023    Sig:   TAKE 1 TABLET BY MOUTH DAILY     Route:   (none)         Next OV   Future Appointments   Date Time Provider Andres Ferro   11/4/2023 11:00 AM Vandana Koroma DO [facility]     Gynecology Medication Protocol [id number]/24/2023 12:44 PM    PASS-PENDING LAST PAP WNL--VIA MANUAL LOOKUP    Physical or Pelvic/Breast in past 12 or next 3 mos--VIA MANUAL LOOKUP

## 2023-11-15 DIAGNOSIS — F98.8 ATTENTION DEFICIT DISORDER (ADD) WITHOUT HYPERACTIVITY: ICD-10-CM

## 2023-11-15 RX ORDER — DEXTROAMPHETAMINE SACCHARATE, AMPHETAMINE ASPARTATE MONOHYDRATE, DEXTROAMPHETAMINE SULFATE AND AMPHETAMINE SULFATE 3.75; 3.75; 3.75; 3.75 MG/1; MG/1; MG/1; MG/1
15 CAPSULE, EXTENDED RELEASE ORAL DAILY
Qty: 30 CAPSULE | Refills: 0 | Status: SHIPPED | OUTPATIENT
Start: 2024-01-11 | End: 2024-02-10

## 2023-11-15 RX ORDER — DEXTROAMPHETAMINE SACCHARATE, AMPHETAMINE ASPARTATE MONOHYDRATE, DEXTROAMPHETAMINE SULFATE AND AMPHETAMINE SULFATE 3.75; 3.75; 3.75; 3.75 MG/1; MG/1; MG/1; MG/1
15 CAPSULE, EXTENDED RELEASE ORAL DAILY
Qty: 30 CAPSULE | Refills: 0 | Status: SHIPPED | OUTPATIENT
Start: 2023-11-15 | End: 2023-12-15

## 2023-11-15 RX ORDER — DEXTROAMPHETAMINE SACCHARATE, AMPHETAMINE ASPARTATE MONOHYDRATE, DEXTROAMPHETAMINE SULFATE AND AMPHETAMINE SULFATE 3.75; 3.75; 3.75; 3.75 MG/1; MG/1; MG/1; MG/1
15 CAPSULE, EXTENDED RELEASE ORAL DAILY
Qty: 30 CAPSULE | Refills: 0 | Status: SHIPPED | OUTPATIENT
Start: 2023-12-11 | End: 2024-01-10

## 2023-11-15 NOTE — TELEPHONE ENCOUNTER
Pt calling stating she is having trouble refilling the Extended Release, as discussed with provider she would call if she found another pharmacy.      Can we send ER to [facility], [address] [phone], [phone]

## 2023-12-06 DIAGNOSIS — F98.8 ATTENTION DEFICIT DISORDER (ADD) WITHOUT HYPERACTIVITY: ICD-10-CM

## 2023-12-06 RX ORDER — DEXTROAMPHETAMINE SACCHARATE, AMPHETAMINE ASPARTATE, DEXTROAMPHETAMINE SULFATE AND AMPHETAMINE SULFATE 2.5; 2.5; 2.5; 2.5 MG/1; MG/1; MG/1; MG/1
TABLET ORAL
Qty: 45 TABLET | Refills: 0 | Status: SHIPPED | OUTPATIENT
Start: 2023-12-06

## 2023-12-06 NOTE — TELEPHONE ENCOUNTER
[facility] is out of 10mg, can we please send to [facility] in [place].  Pt is out of this one      amphetamine-dextroamphetamine (ADDERALL) 10 MG Oral Tab     [address] [facility], [address] [phone], [phone]

## 2023-12-16 DIAGNOSIS — Z30.41 ENCOUNTER FOR SURVEILLANCE OF CONTRACEPTIVE PILLS: ICD-10-CM

## 2023-12-18 RX ORDER — NORETHINDRONE ACETATE AND ETHINYL ESTRADIOL 1MG-20(21)
1 KIT ORAL DAILY
Qty: 84 TABLET | Refills: 3 | Status: SHIPPED | OUTPATIENT
Start: 2023-12-18

## 2023-12-18 NOTE — TELEPHONE ENCOUNTER
LOV 11/4/2023     LAST LAB    LAST RX   Medication Quantity Refills Start End   Norethin Ace-Eth Estrad-FE (BLISOVI FE 1/20) 1-20 MG-MCG Oral Tab 84 tablet 0 9/25/2023 --   Sig:   TAKE 1 TABLET BY MOUTH DAILY     Route:   (none)         Next OV   Future Appointments   Date Time Provider Andres Ferro   2/3/2024 11:40 AM Gladystine DO Kevyn [facility]        PROTOCOL     Gynecology Medication Protocol [id number]/16/2023 01:38 PM    PASS-PENDING LAST PAP WNL--VIA MANUAL LOOKUP    Physical or Pelvic/Breast in past 12 or next 3 mos--VIA MANUAL LOOKUP
09-May-2018 10:00

## 2024-01-05 ENCOUNTER — TELEPHONE (OUTPATIENT)
Dept: FAMILY MEDICINE CLINIC | Facility: CLINIC | Age: 34
End: 2024-01-05

## 2024-01-05 DIAGNOSIS — F98.8 ATTENTION DEFICIT DISORDER (ADD) WITHOUT HYPERACTIVITY: ICD-10-CM

## 2024-01-05 RX ORDER — DEXTROAMPHETAMINE SACCHARATE, AMPHETAMINE ASPARTATE, DEXTROAMPHETAMINE SULFATE AND AMPHETAMINE SULFATE 2.5; 2.5; 2.5; 2.5 MG/1; MG/1; MG/1; MG/1
TABLET ORAL
Qty: 45 TABLET | Refills: 0 | Status: SHIPPED | OUTPATIENT
Start: 2024-01-11

## 2024-01-05 NOTE — TELEPHONE ENCOUNTER
Dr. Rollins,  please review    Adderall rx for 15 mg already sent to [facility].  Adderall rx for 10 mg was sent to [facility].  Rx pended for [facility]

## 2024-01-05 NOTE — TELEPHONE ENCOUNTER
Patient called asking for the adderall scripts in the systems to be sent to [facility] - listed in epic now as # 1.  The [facility] was # [facility]. She had me remove it.

## 2024-02-23 ENCOUNTER — PATIENT MESSAGE (OUTPATIENT)
Dept: FAMILY MEDICINE CLINIC | Facility: CLINIC | Age: 34
End: 2024-02-23

## 2024-02-26 NOTE — TELEPHONE ENCOUNTER
From: Ruchi Obrien  To: Neena Rollins  Sent: 2/23/2024 5:18 PM CST  Subject: Birth Control    Hi! I was just checking in to see if there were any birth control pills that might be better for acne? My breakouts have been getting more severe and pop up during my period. Thank you!

## 2024-02-28 RX ORDER — DROSPIRENONE AND ETHINYL ESTRADIOL 0.02-3(28)
1 KIT ORAL DAILY
Qty: 28 TABLET | Refills: 12 | Status: SHIPPED | OUTPATIENT
Start: 2024-02-28 | End: 2025-02-27

## 2024-07-16 ENCOUNTER — OFFICE VISIT (OUTPATIENT)
Dept: FAMILY MEDICINE CLINIC | Facility: CLINIC | Age: 34
End: 2024-07-16
Payer: COMMERCIAL

## 2024-07-16 VITALS
WEIGHT: 107 LBS | RESPIRATION RATE: 16 BRPM | TEMPERATURE: 98 F | HEART RATE: 92 BPM | BODY MASS INDEX: 19.69 KG/M2 | SYSTOLIC BLOOD PRESSURE: 92 MMHG | HEIGHT: 62 IN | DIASTOLIC BLOOD PRESSURE: 60 MMHG | OXYGEN SATURATION: 98 %

## 2024-07-16 DIAGNOSIS — F98.8 ATTENTION DEFICIT DISORDER (ADD) WITHOUT HYPERACTIVITY: ICD-10-CM

## 2024-07-16 DIAGNOSIS — Z00.00 ENCOUNTER FOR GENERAL ADULT MEDICAL EXAMINATION WITHOUT ABNORMAL FINDINGS: Primary | ICD-10-CM

## 2024-07-16 PROCEDURE — 3008F BODY MASS INDEX DOCD: CPT | Performed by: FAMILY MEDICINE

## 2024-07-16 PROCEDURE — 3074F SYST BP LT 130 MM HG: CPT | Performed by: FAMILY MEDICINE

## 2024-07-16 PROCEDURE — 3078F DIAST BP <80 MM HG: CPT | Performed by: FAMILY MEDICINE

## 2024-07-16 PROCEDURE — 99395 PREV VISIT EST AGE 18-39: CPT | Performed by: FAMILY MEDICINE

## 2024-07-16 RX ORDER — DEXTROAMPHETAMINE SACCHARATE, AMPHETAMINE ASPARTATE MONOHYDRATE, DEXTROAMPHETAMINE SULFATE AND AMPHETAMINE SULFATE 3.75; 3.75; 3.75; 3.75 MG/1; MG/1; MG/1; MG/1
15 CAPSULE, EXTENDED RELEASE ORAL DAILY
Qty: 30 CAPSULE | Refills: 0 | Status: SHIPPED | OUTPATIENT
Start: 2024-08-16 | End: 2024-09-15

## 2024-07-16 RX ORDER — DEXTROAMPHETAMINE SACCHARATE, AMPHETAMINE ASPARTATE, DEXTROAMPHETAMINE SULFATE AND AMPHETAMINE SULFATE 2.5; 2.5; 2.5; 2.5 MG/1; MG/1; MG/1; MG/1
TABLET ORAL
Qty: 45 TABLET | Refills: 0 | Status: SHIPPED | OUTPATIENT
Start: 2024-09-16

## 2024-07-16 RX ORDER — DEXTROAMPHETAMINE SACCHARATE, AMPHETAMINE ASPARTATE, DEXTROAMPHETAMINE SULFATE AND AMPHETAMINE SULFATE 2.5; 2.5; 2.5; 2.5 MG/1; MG/1; MG/1; MG/1
TABLET ORAL
Qty: 45 TABLET | Refills: 0 | Status: SHIPPED | OUTPATIENT
Start: 2024-07-16

## 2024-07-16 RX ORDER — DEXTROAMPHETAMINE SACCHARATE, AMPHETAMINE ASPARTATE MONOHYDRATE, DEXTROAMPHETAMINE SULFATE AND AMPHETAMINE SULFATE 3.75; 3.75; 3.75; 3.75 MG/1; MG/1; MG/1; MG/1
15 CAPSULE, EXTENDED RELEASE ORAL DAILY
Qty: 30 CAPSULE | Refills: 0 | Status: SHIPPED | OUTPATIENT
Start: 2024-09-16 | End: 2024-10-16

## 2024-07-16 RX ORDER — DEXTROAMPHETAMINE SACCHARATE, AMPHETAMINE ASPARTATE, DEXTROAMPHETAMINE SULFATE AND AMPHETAMINE SULFATE 2.5; 2.5; 2.5; 2.5 MG/1; MG/1; MG/1; MG/1
TABLET ORAL
Qty: 45 TABLET | Refills: 0 | Status: SHIPPED | OUTPATIENT
Start: 2024-08-16

## 2024-07-16 RX ORDER — DEXTROAMPHETAMINE SACCHARATE, AMPHETAMINE ASPARTATE MONOHYDRATE, DEXTROAMPHETAMINE SULFATE AND AMPHETAMINE SULFATE 3.75; 3.75; 3.75; 3.75 MG/1; MG/1; MG/1; MG/1
15 CAPSULE, EXTENDED RELEASE ORAL DAILY
Qty: 30 CAPSULE | Refills: 0 | Status: SHIPPED | OUTPATIENT
Start: 2024-07-16 | End: 2024-08-15

## 2024-07-16 NOTE — PROGRESS NOTES
Family Medicine Video Visit  ASSESSMENT & PLAN  Ruchi Obrien is a 34 year old female who is here for:   1. Encounter for general adult medical examination without abnormal findings  Wellness Exam done today and routine Preventative Care discussed as noted below.   -Pap smear: 02/26/2021 up-to-date   -G&C testing: declined  -Contraception:  OCP  -Immunizations:  up-to-date   -Healthy eating habits and regular exercise encouraged     2. Attention deficit disorder (ADD) without hyperactivity  Stable BP/weight on current meds;  No Adverse side effects.  Desires to continue current RX therapy.   -Contin Adderall XR 15mg AM,  Adderall IR 10mg Afternoon and 5mg in PM   -Aware of behavior modifications  -Aware of safe-handling of controlled substance-   -F/U 3 mos   - Amphetamine-Dextroamphet ER (ADDERALL XR) 15 MG Oral Capsule SR 24 Hr; Take 1 capsule (15 mg total) by mouth daily.  Dispense: 30 capsule; Refill: 0  - Amphetamine-Dextroamphet ER (ADDERALL XR) 15 MG Oral Capsule SR 24 Hr; Take 1 capsule (15 mg total) by mouth daily. Fill prescription in 30 days.  Dispense: 30 capsule; Refill: 0  - Amphetamine-Dextroamphet ER (ADDERALL XR) 15 MG Oral Capsule SR 24 Hr; Take 1 capsule (15 mg total) by mouth daily. Fill prescription in 61 days.  Dispense: 30 capsule; Refill: 0  - amphetamine-dextroamphetamine (ADDERALL) 10 MG Oral Tab; Take 10mg in AM and 5mg in PM  Dispense: 45 tablet; Refill: 0  - amphetamine-dextroamphetamine (ADDERALL) 10 MG Oral Tab; Take 10mg in AM and 5mg in PM  Dispense: 45 tablet; Refill: 0  - amphetamine-dextroamphetamine (ADDERALL) 10 MG Oral Tab; Take 10mg in AM and 5mg in PM  Dispense: 45 tablet; Refill: 0      Follow-Up: Return in about 6 months (around 1/16/2025) for F/U ADHD.      Neena Rollins DO   07/16/24      CC: ADD (Medication follow up)    SUBJECTIVE   History of Present Illness:  History obtained from patient.    Ruchi Obrien is a 34 year old female who presents for ADD  (Medication follow up)     HPI- FU ADHD- current RX doing well.   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   Prev Methyphenidate and Concerta, was on this for years and it became less effective;    Meds:  Adderall XR 15mg QAM and then Adderall IR 10mg in early afternoon and then 5mg in PM   Wear-off symptoms (inattentiveness, hyperactivity, more emotional)? Notices, but aware she can't increase dose.   Side effects (decreased appetite, headache, stomach ache, sleep problems, tics)? Denies    Really likes the current regimen and would prefer to continue.    Ended up stopping the Wellbutrin as she doesn't feel she needs it.       ANNUAL PHYSICAL  Menses: Regular without any break through bleeding or concerning symptoms.   LMP: Patient's last menstrual period was 2024.  Concern for STI: Denies   Contraception:  OCPs  Cervical Cancer Screening- up-to-date    PMH of Abnormal Pap: denies   Exercise: generally tries to be active  Healthy eating habits:  Well-rounded  Tobacco: former   Immunizations: up-to-date      Pmhx: ADD  Pshx: WTE   All: NKDA;   Meds: as below  Soc hx: no tob/occ etoh/no illicits; ; sexually active, monogamous relationship   Ob/gyne: Patient's last menstrual period was 2024. . last pap: 2021, last mammogram: -,  ,     Current Medications:  Current Outpatient Medications   Medication Sig Dispense Refill    Amphetamine-Dextroamphet ER (ADDERALL XR) 15 MG Oral Capsule SR 24 Hr Take 1 capsule (15 mg total) by mouth daily. 30 capsule 0    [START ON 2024] Amphetamine-Dextroamphet ER (ADDERALL XR) 15 MG Oral Capsule SR 24 Hr Take 1 capsule (15 mg total) by mouth daily. Fill prescription in 30 days. 30 capsule 0    [START ON 2024] Amphetamine-Dextroamphet ER (ADDERALL XR) 15 MG Oral Capsule SR 24 Hr Take 1 capsule (15 mg total) by mouth daily. Fill prescription in 61 days. 30 capsule 0     amphetamine-dextroamphetamine (ADDERALL) 10 MG Oral Tab cassy 10mg in AM and 5mg in PM 45 tablet 0    [START ON 8/16/2024] amphetamine-dextroamphetamine (ADDERALL) 10 MG Oral Tab take 10mg in AM and 5mg in PM 45 tablet 0    [START ON 9/16/2024] amphetamine-dextroamphetamine (ADDERALL) 10 MG Oral Tab take 10mg in AM and 5mg in PM 45 tablet 0    Amphetamine-Dextroamphet ER (ADDERALL XR) 15 MG Oral Capsule SR 24 Hr Take 1 capsule (15 mg total) by mouth daily. Fill prescription in 61 days. 30 capsule 0    amphetamine-dextroamphetamine (ADDERALL) 10 MG Oral Tab Take 10mg in AM and 5mg in PM 45 tablet 0    Drospirenone-Ethinyl Estradiol 3-0.02 MG Oral Tab Take 1 tablet by mouth daily. 28 tablet 12    amphetamine-dextroamphetamine (ADDERALL) 10 MG Oral Tab Take 10mg in AM and 5mg in PM 45 tablet 0    amphetamine-dextroamphetamine (ADDERALL) 10 MG Oral Tab Take 10mg in AM and 5mg in PM 45 tablet 0    amphetamine-dextroamphetamine (ADDERALL) 10 MG Oral Tab Take 10mg in AM and 5mg in PM 45 tablet 0      Past Medical History:  Past Medical History:    ADD (attention deficit disorder) without hyperactivity      Past Surgical History:  Past Surgical History:   Procedure Laterality Date    [place] teeth removed  01/01/2011      Family History:  Family History   Problem Relation Age of Onset    No Known Problems Mother     No Known Problems Father       Social History:  Social History     Socioeconomic History    Marital status: OTHER    Number of children: 0   Occupational History    Occupation: TEACHER     Employer: [employer]   Tobacco Use    Smoking status: Former     Current packs/day: 0.00     Types: Cigarettes    Smokeless tobacco: Never    Tobacco comments:     Prev would smoke socially but hasn't at all for the past >3 mos.    Vaping Use    Vaping status: Never Used   Substance and Sexual Activity    Alcohol use: Yes     Comment: Socially     Drug use: No    Sexual activity: Yes     Partners: Male   Other  Topics Concern    Caffeine Concern No    Stress Concern No    Weight Concern No    Special Diet No    Exercise Yes    Seat Belt Yes    Self-Exams No   Social History Narrative    Long term relationship, no children. No pets (mom has one).       Allergies:  No Known Allergies       OBJECTIVE   VITALS: BP 92/60   Pulse 92   Temp 98.1 °F (36.7 °C) (Temporal)   Resp 16   Ht 5' 2\" (1.575 m)   Wt 107 lb (48.5 kg)   LMP 06/23/2024   SpO2 98%   BMI 19.57 kg/m²    BP Readings from Last 3 Encounters:   07/16/24 92/60   02/03/24 90/60   08/11/23 100/60       Wt Readings from Last 3 Encounters:   07/16/24 107 lb (48.5 kg)   02/03/24 107 lb (48.5 kg)   08/11/23 105 lb (47.6 kg)       PHYSICAL EXAM:  GEN: pleasant, well-appearing in NAD, AOX3  SKIN: no visible rashes, lesions, or evidence of trauma  HEENT: PERRL, EOMI, moist mucous membranes, oropharynx clear, TM clear, nares patent, no Thyromegaly or nodule.   CV: RRR, no murmurs or abnl heart sounds   PULM: Clear to auscultation, No wheezes, rales, rhonchi.  Non-labored breathing.  ABD: Soft, non-tender, non-distended, + BS, no rigidity/guarding  EXT:  Warm, well perfused, no lower extremity edema  NEURO: CNs grossly intact, no focal weakness  MSK: moves all 4 extremities without difficulty  PSYCH: mood and affect are appropriate        Neena Rollins DO    07/16/24 1:52 PM  *

## 2024-09-17 DIAGNOSIS — F98.8 ATTENTION DEFICIT DISORDER (ADD) WITHOUT HYPERACTIVITY: ICD-10-CM

## 2024-09-17 RX ORDER — DEXTROAMPHETAMINE SACCHARATE, AMPHETAMINE ASPARTATE, DEXTROAMPHETAMINE SULFATE AND AMPHETAMINE SULFATE 2.5; 2.5; 2.5; 2.5 MG/1; MG/1; MG/1; MG/1
TABLET ORAL
Qty: 45 TABLET | Refills: 0 | Status: SHIPPED | OUTPATIENT
Start: 2024-09-17

## 2024-09-17 RX ORDER — DEXTROAMPHETAMINE SACCHARATE, AMPHETAMINE ASPARTATE MONOHYDRATE, DEXTROAMPHETAMINE SULFATE AND AMPHETAMINE SULFATE 3.75; 3.75; 3.75; 3.75 MG/1; MG/1; MG/1; MG/1
15 CAPSULE, EXTENDED RELEASE ORAL EVERY MORNING
Qty: 30 CAPSULE | Refills: 0 | Status: SHIPPED | OUTPATIENT
Start: 2024-09-17 | End: 2024-10-17

## 2024-09-17 NOTE — TELEPHONE ENCOUNTER
Patient calling stating that her pharmacy will be closing next week as it is going out of business. They are able to fill her ER adderall, but not the other one. Patient is requesting it be sent to [facility] .       amphetamine-dextroamphetamine (ADDERALL) 10 MG Oral Tab     [id number] [place] [address] [facility] AT [facility], [phone], [phone]

## 2024-09-17 NOTE — TELEPHONE ENCOUNTER
Patient states that [facility] called and they do not have the XR either. Needs both sent to [facility]

## 2024-10-15 DIAGNOSIS — F98.8 ATTENTION DEFICIT DISORDER (ADD) WITHOUT HYPERACTIVITY: ICD-10-CM

## 2024-10-15 RX ORDER — DEXTROAMPHETAMINE SACCHARATE, AMPHETAMINE ASPARTATE MONOHYDRATE, DEXTROAMPHETAMINE SULFATE AND AMPHETAMINE SULFATE 3.75; 3.75; 3.75; 3.75 MG/1; MG/1; MG/1; MG/1
15 CAPSULE, EXTENDED RELEASE ORAL DAILY
Qty: 30 CAPSULE | Refills: 0 | Status: SHIPPED | OUTPATIENT
Start: 2024-10-15 | End: 2024-10-17

## 2024-10-15 NOTE — TELEPHONE ENCOUNTER
[facility] calling [facility] one in [facility]. Only this one, not the other script. Please advise      Amphetamine-Dextroamphet ER (ADDERALL XR) 15 MG Oral Capsule SR 24 Hr         [facility] [address] [place] [address] - [facility] [facility] [phone], [phone]

## 2024-10-16 NOTE — TELEPHONE ENCOUNTER
Spoke to patient who spoke to [facility] pharmacy and she was informed [facility] is unable to refill this medication, because the refill date is for December 16 instead of October 16.    Patient has called several pharmacies and they are out.  Patient is requesting this be sent urgently to the [facility] using date Ocber 16.

## 2024-10-17 RX ORDER — DEXTROAMPHETAMINE SACCHARATE, AMPHETAMINE ASPARTATE MONOHYDRATE, DEXTROAMPHETAMINE SULFATE AND AMPHETAMINE SULFATE 3.75; 3.75; 3.75; 3.75 MG/1; MG/1; MG/1; MG/1
15 CAPSULE, EXTENDED RELEASE ORAL DAILY
Qty: 30 CAPSULE | Refills: 0 | Status: SHIPPED | OUTPATIENT
Start: 2024-10-17 | End: 2024-11-16

## 2024-10-17 NOTE — TELEPHONE ENCOUNTER
Pt called back stating [facility].     [id number] [place] [place] - [address] [facility] [phone], [phone]

## 2024-10-17 NOTE — TELEPHONE ENCOUNTER
[facility] and [facility] not [facility] now, she is having difficulties getting pharmacies to tell her if this medication is in stock    [employer] message sent with next steps

## 2024-10-17 NOTE — TELEPHONE ENCOUNTER
Patient called. Adderall rx that was sent to [facility] yesterday was for Dec refill. She is requesting Oct refill be sent to [facility].

## 2025-03-07 ENCOUNTER — PATIENT MESSAGE (OUTPATIENT)
Dept: FAMILY MEDICINE CLINIC | Facility: CLINIC | Age: 35
End: 2025-03-07

## 2025-03-07 DIAGNOSIS — F98.8 ATTENTION DEFICIT DISORDER (ADD) WITHOUT HYPERACTIVITY: ICD-10-CM

## 2025-03-10 RX ORDER — DEXTROAMPHETAMINE SACCHARATE, AMPHETAMINE ASPARTATE MONOHYDRATE, DEXTROAMPHETAMINE SULFATE AND AMPHETAMINE SULFATE 3.75; 3.75; 3.75; 3.75 MG/1; MG/1; MG/1; MG/1
15 CAPSULE, EXTENDED RELEASE ORAL DAILY
Qty: 30 CAPSULE | Refills: 0 | Status: SHIPPED | OUTPATIENT
Start: 2025-06-06 | End: 2025-07-06

## 2025-03-10 RX ORDER — DEXTROAMPHETAMINE SACCHARATE, AMPHETAMINE ASPARTATE MONOHYDRATE, DEXTROAMPHETAMINE SULFATE AND AMPHETAMINE SULFATE 3.75; 3.75; 3.75; 3.75 MG/1; MG/1; MG/1; MG/1
15 CAPSULE, EXTENDED RELEASE ORAL DAILY
Qty: 30 CAPSULE | Refills: 0 | Status: SHIPPED | OUTPATIENT
Start: 2025-05-07 | End: 2025-06-06

## 2025-03-10 RX ORDER — DEXTROAMPHETAMINE SACCHARATE, AMPHETAMINE ASPARTATE MONOHYDRATE, DEXTROAMPHETAMINE SULFATE AND AMPHETAMINE SULFATE 3.75; 3.75; 3.75; 3.75 MG/1; MG/1; MG/1; MG/1
15 CAPSULE, EXTENDED RELEASE ORAL DAILY
Qty: 30 CAPSULE | Refills: 0 | Status: SHIPPED | OUTPATIENT
Start: 2025-04-07 | End: 2025-05-07

## 2025-03-10 RX ORDER — DEXTROAMPHETAMINE SACCHARATE, AMPHETAMINE ASPARTATE, DEXTROAMPHETAMINE SULFATE AND AMPHETAMINE SULFATE 2.5; 2.5; 2.5; 2.5 MG/1; MG/1; MG/1; MG/1
TABLET ORAL
Qty: 45 TABLET | Refills: 0 | Status: CANCELLED
Start: 2025-03-12 | End: 2025-04-11

## 2025-03-10 RX ORDER — DEXTROAMPHETAMINE SACCHARATE, AMPHETAMINE ASPARTATE, DEXTROAMPHETAMINE SULFATE AND AMPHETAMINE SULFATE 2.5; 2.5; 2.5; 2.5 MG/1; MG/1; MG/1; MG/1
TABLET ORAL
Qty: 45 TABLET | Refills: 0 | Status: SHIPPED | OUTPATIENT
Start: 2025-05-07 | End: 2025-06-07

## 2025-03-10 RX ORDER — DEXTROAMPHETAMINE SACCHARATE, AMPHETAMINE ASPARTATE MONOHYDRATE, DEXTROAMPHETAMINE SULFATE AND AMPHETAMINE SULFATE 3.75; 3.75; 3.75; 3.75 MG/1; MG/1; MG/1; MG/1
15 CAPSULE, EXTENDED RELEASE ORAL DAILY
Qty: 30 CAPSULE | Refills: 0 | Status: CANCELLED
Start: 2025-03-12 | End: 2025-04-11

## 2025-03-10 RX ORDER — DEXTROAMPHETAMINE SACCHARATE, AMPHETAMINE ASPARTATE, DEXTROAMPHETAMINE SULFATE AND AMPHETAMINE SULFATE 2.5; 2.5; 2.5; 2.5 MG/1; MG/1; MG/1; MG/1
TABLET ORAL
Qty: 45 TABLET | Refills: 0 | Status: SHIPPED | OUTPATIENT
Start: 2025-04-07 | End: 2025-05-09

## 2025-03-10 RX ORDER — DEXTROAMPHETAMINE SACCHARATE, AMPHETAMINE ASPARTATE, DEXTROAMPHETAMINE SULFATE AND AMPHETAMINE SULFATE 2.5; 2.5; 2.5; 2.5 MG/1; MG/1; MG/1; MG/1
TABLET ORAL
Qty: 45 TABLET | Refills: 0 | Status: SHIPPED | OUTPATIENT
Start: 2025-06-06 | End: 2025-07-07

## 2025-03-18 RX ORDER — NORETHINDRONE ACETATE AND ETHINYL ESTRADIOL 1MG-20(21)
1 KIT ORAL DAILY
Qty: 84 TABLET | Refills: 0 | OUTPATIENT
Start: 2025-03-18

## 2025-06-17 ENCOUNTER — OFFICE VISIT (OUTPATIENT)
Dept: FAMILY MEDICINE CLINIC | Facility: CLINIC | Age: 35
End: 2025-06-17
Payer: COMMERCIAL

## 2025-06-17 VITALS
TEMPERATURE: 98 F | OXYGEN SATURATION: 98 % | HEIGHT: 62 IN | DIASTOLIC BLOOD PRESSURE: 50 MMHG | SYSTOLIC BLOOD PRESSURE: 100 MMHG | WEIGHT: 103 LBS | HEART RATE: 106 BPM | BODY MASS INDEX: 18.95 KG/M2 | RESPIRATION RATE: 16 BRPM

## 2025-06-17 DIAGNOSIS — F98.8 ATTENTION DEFICIT DISORDER (ADD) WITHOUT HYPERACTIVITY: ICD-10-CM

## 2025-06-17 DIAGNOSIS — Z00.00 ENCOUNTER FOR GENERAL ADULT MEDICAL EXAMINATION WITHOUT ABNORMAL FINDINGS: Primary | ICD-10-CM

## 2025-06-17 DIAGNOSIS — Z30.41 ENCOUNTER FOR SURVEILLANCE OF CONTRACEPTIVE PILLS: ICD-10-CM

## 2025-06-17 RX ORDER — ETHINYL ESTRADIOL/DROSPIRENONE 0.02-3(28)
1 TABLET ORAL DAILY
Qty: 84 TABLET | Refills: 3 | Status: CANCELLED | OUTPATIENT
Start: 2025-06-17 | End: 2026-06-12

## 2025-06-17 RX ORDER — DROSPIRENONE AND ETHINYL ESTRADIOL 0.02-3(28)
1 KIT ORAL DAILY
Qty: 84 TABLET | Refills: 3 | Status: SHIPPED | OUTPATIENT
Start: 2025-06-17 | End: 2026-06-17

## 2025-06-17 NOTE — ASSESSMENT & PLAN NOTE
Stable BP/weight on current meds;  No Adverse side effects.  Desires to continue current RX therapy.   -Contin Adderall XR 15mg AM,  Adderall IR 10mg Afternoon and 5mg in PM   -Aware of behavior modifications  -Aware of safe-handling of controlled substance-   - Contact clinic in July when next 3 mos RX due.   - F2F follow-up in 6 mos

## 2025-06-17 NOTE — ASSESSMENT & PLAN NOTE
Chronic, Stable, contin meds as below   Orders:    Drospirenone-Ethinyl Estradiol; Take 1 tablet by mouth daily.  Dispense: 84 tablet; Refill: 3

## 2025-06-17 NOTE — PROGRESS NOTES
Family Medicine  Visit  ASSESSMENT & PLAN  follow-Up: Return in about 6 months (around 12/17/2025) for F/U ADHD.     Assessment & Plan  Encounter for general adult medical examination without abnormal findings  Wellness Exam done today and routine Preventative Care discussed as noted below.   -Pap smear: 02/26/2021   up-to-date    -G&C testing: declined  -Contraception:  OCP   -Healthy eating habits and regular exercise encouraged   Encounter for surveillance of contraceptive pills  Chronic, Stable, contin meds as below   Orders:    Drospirenone-Ethinyl Estradiol; Take 1 tablet by mouth daily.  Dispense: 84 tablet; Refill: 3    Attention deficit disorder (ADD) without hyperactivity  Stable BP/weight on current meds;  No Adverse side effects.  Desires to continue current RX therapy.   -Contin Adderall XR 15mg AM,  Adderall IR 10mg Afternoon and 5mg in PM   -Aware of behavior modifications  -Aware of safe-handling of controlled substance-   - Contact clinic in July when next 3 mos RX due.   - F2F follow-up in 6 mos           SUBJECTIVE   CC: ADHD (Medication follow up)  History of Present Illness:  History obtained from patient.    Ruchi Obrine is a 35 year old female who presents for ADHD (Medication follow up)     History of Present Illness  ANNUAL PHYSICAL  Menses: Regular without any break through bleeding or concerning symptoms.   LMP: Patient's last menstrual period was 06/11/2025.  Concern for STI: Denies   Contraception:  OCPs- planning to start TTC after her wedding in March 2026  Cervical Cancer Screening- up-to-date    PMH of Abnormal Pap: denies   Exercise: generally tries to be active  Healthy eating habits:  Well-rounded  Tobacco: former   Immunizations: up-to-date      OCP-She has been experiencing worsening acne for about a year, which initially improved with Stacy, a birth control medication. However, she was switched back to Blisovi, her previous birth control, which she is currently taking but  plans to discontinue eventually.  She is planning to get  in March and does not want to become pregnant before then.   Her menstrual periods have been normal while on birth control.               HPI- FU ADHD- current RX doing well.   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   Prev Methyphenidate and Concerta, Wellbutrin was on this for years and it became less effective;    Meds:  Adderall XR 15mg QAM and then Adderall IR 10mg in early afternoon and then 5mg in PM   Side effects (decreased appetite, headache, stomach ache, sleep problems, tics)? Denies    Really likes the current regimen and would prefer to continue.          ADD-Adderall XR 15mg QAM and then Adderall IR 10mg in early afternoon and then 5mg in PM   Acne- OCPs-   Pshx: WTE   All: NKDA;   Meds: as below  Soc hx: no tob/occ etoh/no illicits; ; sexually active, monogamous relationship - Engaged and wedding is in 3/2026!  Ob/gyne: Patient's last menstrual period was 2025. . last pap: 2021, last mammogram: -,  ,     Current Medications:  Current Medications[1]     Past Medical History:  Past Medical History:    ADD (attention deficit disorder) without hyperactivity      Past Surgical History:  Past Surgical History:   Procedure Laterality Date    Jackson teeth removed  2011      Family History:  Family History   Problem Relation Age of Onset    No Known Problems Mother     Depression Father     Heart Disorder Maternal Grandfather     Asthma Brother     Depression Brother       Social History:  Short Social Hx on File[2]      Allergies:  No Known Allergies       OBJECTIVE   VITALS: /50   Pulse 106   Temp 98 °F (36.7 °C) (Temporal)   Resp 16   Ht 5' 2\" (1.575 m)   Wt 103 lb (46.7 kg)   LMP 2025   SpO2 98%   BMI 18.84 kg/m²    BP Readings from Last 3 Encounters:   25 100/50   25 104/60   24 92/60        Wt Readings from Last 3 Encounters:   06/17/25 103 lb (46.7 kg)   01/09/25 104 lb 6.4 oz (47.4 kg)   07/16/24 107 lb (48.5 kg)       PHYSICAL EXAM:  GEN: pleasant, well-appearing in NAD, AOX3  SKIN: no visible rashes, lesions, or evidence of trauma  HEENT: PERRL, EOMI, moist mucous membranes, oropharynx clear, TM clear, nares patent, no Thyromegaly or nodule.   CV: RRR, no murmurs or abnl heart sounds   PULM: Clear to auscultation, No wheezes, rales, rhonchi.  Non-labored breathing.  ABD: Soft, non-tender, non-distended, + BS, no rigidity/guarding  EXT:  Warm, well perfused, no lower extremity edema  NEURO: CNs grossly intact, no focal weakness  MSK: moves all 4 extremities without difficulty  PSYCH: mood and affect are appropriate        Neena Rollins DO    06/17/25 3:50 PM             [1]   Current Outpatient Medications   Medication Sig Dispense Refill    Drospirenone-Ethinyl Estradiol 3-0.02 MG Oral Tab Take 1 tablet by mouth daily. 84 tablet 3    Amphetamine-Dextroamphet ER (ADDERALL XR) 15 MG Oral Capsule SR 24 Hr Take 1 capsule (15 mg total) by mouth daily. Fill prescription in 61 days. 30 capsule 0    amphetamine-dextroamphetamine (ADDERALL) 10 MG Oral Tab take 10mg in AM and 5mg in PM 45 tablet 0    amphetamine-dextroamphetamine (ADDERALL) 10 MG Oral Tab cassy 10mg in AM and 5mg in PM 45 tablet 0    amphetamine-dextroamphetamine (ADDERALL) 10 MG Oral Tab cassy 10mg in AM and 5mg in PM (Patient not taking: Reported on 1/9/2025) 45 tablet 0    amphetamine-dextroamphetamine (ADDERALL) 10 MG Oral Tab acssy 10mg in AM and 5mg in PM (Patient not taking: Reported on 1/9/2025) 45 tablet 0    amphetamine-dextroamphetamine (ADDERALL) 10 MG Oral Tab cassy 10mg in AM and 5mg in PM (Patient not taking: Reported on 1/9/2025) 45 tablet 0    amphetamine-dextroamphetamine (ADDERALL) 10 MG Oral Tab Take 10mg in AM and 5mg in PM (Patient not taking: Reported on 1/9/2025) 45 tablet 0   [2]   Social History  Socioeconomic  History    Marital status: OTHER    Number of children: 0   Occupational History    Occupation: TEACHER     Employer: [employer]   Tobacco Use    Smoking status: Former     Current packs/day: 0.00     Types: Cigarettes     Quit date: 12/15/2023     Years since quittin.5    Smokeless tobacco: Never    Tobacco comments:     Prev would smoke socially but hasn't at all for the past >3 mos.    Vaping Use    Vaping status: Never Used   Substance and Sexual Activity    Alcohol use: Yes     Alcohol/week: 5.0 standard drinks of alcohol     Types: 5 Cans of beer per week     Comment: Socially     Drug use: No    Sexual activity: Yes     Partners: Male   Other Topics Concern    Caffeine Concern No    Stress Concern No    Weight Concern No    Special Diet No    Exercise Yes    Seat Belt Yes    Self-Exams No   Social History Narrative    Long term relationship, no children. No pets (mom has one).     Social Drivers of Health     Food Insecurity: No Food Insecurity (2025)    NCSS - Food Insecurity     Worried About Running Out of Food in the Last Year: No     Ran Out of Food in the Last Year: No   Transportation Needs: No Transportation Needs (2025)    NCSS - Transportation     Lack of Transportation: No   Housing Stability: Not At Risk (2025)    NCSS - Housing/Utilities     Has Housing: Yes     Worried About Losing Housing: No     Unable to Get Utilities: No

## (undated) NOTE — MR AVS SNAPSHOT
[facility] [address] [place] [id number]  [phone]               Thank you for choosing us for your health care visit with Crystal Chavez MD.  We are glad to serve you and happy to provide you with this * Methylphenidate HCl 20 MG Tabs   Take 1 tablet (20 mg total) by mouth daily. Commonly known as:  RITALIN   Start taking on:  5/25/2017           * Methylphenidate HCl ER 54 MG Tbcr   Take 1 tablet (54 mg total) by mouth daily.    Commonly known as:  CO Visit [facility] online at  [email or web address]

## (undated) NOTE — MR AVS SNAPSHOT
[address] [id number] [phone]               Thank you for choosing us for your health care visit with Jim Roberts MD.  We are glad to serve you and happy to provide you with this understand how and when to take each. - Another medication with the same name was removed. Continue taking this medication, and follow the directions you see here.    Commonly known as:  RITALIN           * Methylphenidate HCl ER 54 MG Tbcr   Take 1 tablet Start taking on:  3/8/2017           * Methylphenidate HCl 20 MG Tabs   Take 1 tablet (20 mg total) by mouth daily. What changed:    - additional instructions  - Another medication with the same name was removed.  Continue taking this medication, and foll If you've recently had a stay at the Hospital you can access your discharge instructions in [employer] by going to Visits < Admission Summaries.  If you've been to the Emergency Department or your doctor's office, you can view your past visit information in My Visit [facility] online at  [email or web address]

## (undated) NOTE — LETTER
06/06/19    To whom it may concern:    Re: [address] [facility] 3/4/1990    The above-named patient was examined and found to be free of communicable disease. SHe is physically fit to work.  She will have TB testing done within the next few w